# Patient Record
Sex: MALE | Race: WHITE | NOT HISPANIC OR LATINO | Employment: OTHER | URBAN - METROPOLITAN AREA
[De-identification: names, ages, dates, MRNs, and addresses within clinical notes are randomized per-mention and may not be internally consistent; named-entity substitution may affect disease eponyms.]

---

## 2023-10-20 DIAGNOSIS — R07.9 CHEST PAIN, UNSPECIFIED TYPE: ICD-10-CM

## 2023-10-20 PROBLEM — N40.0 BPH (BENIGN PROSTATIC HYPERPLASIA): Status: ACTIVE | Noted: 2023-10-20

## 2023-10-20 PROBLEM — J44.9 COPD (CHRONIC OBSTRUCTIVE PULMONARY DISEASE) (MULTI): Status: ACTIVE | Noted: 2023-10-20

## 2023-10-20 PROBLEM — R00.2 PALPITATIONS: Status: ACTIVE | Noted: 2023-10-20

## 2023-10-20 PROBLEM — I48.0 PAROXYSMAL ATRIAL FIBRILLATION (MULTI): Status: ACTIVE | Noted: 2023-10-20

## 2023-10-20 PROBLEM — R06.02 SHORTNESS OF BREATH: Status: ACTIVE | Noted: 2023-10-20

## 2023-10-20 PROBLEM — I25.10 CAD (CORONARY ARTERY DISEASE): Status: ACTIVE | Noted: 2023-10-20

## 2023-10-20 PROBLEM — E78.5 DYSLIPIDEMIA: Status: ACTIVE | Noted: 2023-10-20

## 2023-10-20 PROBLEM — I10 ESSENTIAL HYPERTENSION: Status: ACTIVE | Noted: 2023-10-20

## 2023-10-20 RX ORDER — CLOPIDOGREL BISULFATE 75 MG/1
75 TABLET ORAL DAILY
COMMUNITY

## 2023-10-20 RX ORDER — METOPROLOL SUCCINATE 25 MG/1
25 TABLET, EXTENDED RELEASE ORAL DAILY
COMMUNITY

## 2023-10-20 RX ORDER — NITROGLYCERIN 0.4 MG/1
0.4 TABLET SUBLINGUAL EVERY 5 MIN PRN
Qty: 90 TABLET | Refills: 3 | Status: SHIPPED | OUTPATIENT
Start: 2023-10-20

## 2023-10-20 RX ORDER — NITROGLYCERIN 0.4 MG/1
0.4 TABLET SUBLINGUAL EVERY 5 MIN PRN
COMMUNITY
Start: 2023-05-11 | End: 2023-10-20 | Stop reason: SDUPTHER

## 2023-10-20 RX ORDER — ROSUVASTATIN CALCIUM 20 MG/1
20 TABLET, COATED ORAL DAILY
COMMUNITY
Start: 2022-12-28

## 2023-10-20 RX ORDER — LISINOPRIL 20 MG/1
20 TABLET ORAL DAILY
COMMUNITY

## 2023-10-20 RX ORDER — FINASTERIDE 5 MG/1
5 TABLET, FILM COATED ORAL DAILY
COMMUNITY

## 2023-11-08 PROBLEM — S52.509A DISTAL RADIUS FRACTURE: Status: ACTIVE | Noted: 2023-11-08

## 2023-11-08 PROBLEM — R91.1 LUNG NODULE: Status: ACTIVE | Noted: 2023-11-08

## 2023-11-08 PROBLEM — E66.3 OVERWEIGHT: Status: ACTIVE | Noted: 2023-11-08

## 2023-11-08 RX ORDER — METOPROLOL SUCCINATE 50 MG/1
50 TABLET, EXTENDED RELEASE ORAL DAILY
COMMUNITY

## 2023-12-06 ENCOUNTER — HOSPITAL ENCOUNTER (OUTPATIENT)
Dept: RADIOLOGY | Facility: HOSPITAL | Age: 78
Discharge: HOME | End: 2023-12-06
Payer: MEDICARE

## 2023-12-06 DIAGNOSIS — R91.8 OTHER NONSPECIFIC ABNORMAL FINDING OF LUNG FIELD: ICD-10-CM

## 2023-12-06 PROCEDURE — 71250 CT THORAX DX C-: CPT | Mod: LIO

## 2023-12-06 PROCEDURE — 71250 CT THORAX DX C-: CPT | Mod: LIO | Performed by: RADIOLOGY

## 2023-12-13 ENCOUNTER — CLINICAL SUPPORT (OUTPATIENT)
Dept: CARDIAC REHAB | Facility: HOSPITAL | Age: 78
End: 2023-12-13
Payer: MEDICARE

## 2023-12-13 ENCOUNTER — TRANSCRIBE ORDERS (OUTPATIENT)
Dept: CARDIAC REHAB | Facility: HOSPITAL | Age: 78
End: 2023-12-13
Payer: COMMERCIAL

## 2023-12-13 DIAGNOSIS — J44.9 CHRONIC OBSTRUCTIVE PULMONARY DISEASE, UNSPECIFIED COPD TYPE (MULTI): ICD-10-CM

## 2023-12-13 DIAGNOSIS — J44.9 CHRONIC OBSTRUCTIVE PULMONARY DISEASE, UNSPECIFIED COPD TYPE (MULTI): Primary | ICD-10-CM

## 2023-12-13 NOTE — PROGRESS NOTES
Name: Femi Wilcox   : 1945   Diagnosis: COPD  MRN: [id number]   Onset Date: 2023    Today's Date: 23  Moderate    Respiratory  HX: COPD  Dyspnea: Yes  Describe:SOB with exertion, walking  HX YAZAN: No  CPAP Use: No  Family History of Lung Disease: Yes  Finger Clubbing: No  Hemoptysis: No  Tobacco Use: Quit tobacco use 2013 year ago  Other forms of tobacco: Cigarettes  Anyone in the house smoke: Yes  Vaccines: Flu  Number of colds per year: 2  Number of hospitalizations in past year: has never been hospitalized  Trouble sleeping: difficulty maintaining sleep  Limitations with ADLs: none  Others close to you affected by your health:yes  Activities you would like to do that your lung problem prevents you from doing: walking distances, riding bike  Exposure to asbestos/plastics/fumes/mining dust: {Yes    Cough: dry cough  Resting O2 sat:97%  Uses O2:no  Liters:  When do you use O2?:  none    Lung Sounds:  diminished  Locations: all lobes    Comments:    Neurological   Orientation: oriented to person, place, time, and general circumstances  HX: None  History of stroke/TIA?: none    Comments:    Cardiovascular   HX: CAD, HTN, and HLD  Angina: chest tightness, pressure-like  Describe:  History of Heart Failure: No  EF: N/A    Devices:  none  HX of PAD: No  Arrythmias: atrial fibrillation    Apical: regular  Heart Rate:65  BP:137/71  Radial pulses:  R Present 2+ L Present 2+    Comments:    Skin  Skin Color: normal, no cyanosis, jaundice, pallor or bruising, bruising  Edema:  none      Comments:    Gastrointestinal/Genitourinary  HX: none  Comments:      Psychosocial  Marital status:   Children: 1  Lives alone:  No  Lives with:  Drives:  Yes  Occupation: is retired   Caretaker of family member?:  No  Do you feel safe at home?:  Yes    Caffeinated drinks per day: 1 coffee daily  Alcoholic drinks per day/week:none  HX drug or alcohol abuse: No  Current use of illicit drugs:  No}    Comments:    Musculoskeletal  HX of injury/surgery:    Comments:    Pain Assessment  Current pain:  none  Location:  Description:    Comments:    Fall Risk Assessment  Assistive device: no device  Needs assistance:  No  Afraid of falling:  No  Fall within the past 6 months?: No  Injured with fall:  Fall risk results: low          INDIVIDUAL PULMONARY TREATMENT PLAN-INITIAL ASSESSMENT     Name: Femi Wilcox    Today's Date: 23   : 1945    Primary Provider: Meaghan Conrad CNP  MRN: [id number]    Referring Physician: Susie     Diagnosis: COPD    Onset Date: 2023       OXYGEN ASSESSMENT  SPO2 Rest: 97%  SPO2 Exertion:  Using supplemental O2: No  Liters rest: RA      Liters exertion: RA  Demonstrates appropriate knowledge of O2 use: No  Demonstrates appropriate knowledge of O2 safety: No  Home O2 system: N/A  Portable O2: N/A  Home pulse oximeter: No    Initial MMRC score: 2  Discharge MMRC score:     OXYGEN PLAN   Oxygen Goals:  1. Decrease MMRC score at discharge.  2. Decrease overall dyspnea during exercise using management techniques by discharge.     Oxygen Intervention/Education:   Titrate supplemental oxygen if needed to maintain SPO2 greater than 88%.  Monitor SPO2 pre, during and post exercise.      PSYCHOSOCIAL ASSESSMENT  Patient reported stress level:  none  Using stress management skills: No  HX of anxiety: No  HX of depression: No  Patient questioned regarding any new stress, depression and anxiety symptoms: Yes    Family/Support System: Wife & Daughter and family  Seeing mental health provider: No  Psychosocial medications:    Initial PHQ-9 score:   Discharge PHQ-9 score:   Was PHQ-9 faxed to provider: No  Date faxed:    Initial CAT score: 11  Discharge CAT score:     Stages of change: Action    PSYCHOSOCIAL PLAN  Psychosocial Goals:  Maintain or lower PHQ-9 survey score   Identify personal stressors and begin strategies for managing stress by discharge     Psychosocial  "Interventions/ Education:  Encourage to attend stress management educational class         OTHER CORE COMPONENTS/ RISK FACTORS ASSESSMENT  Medication compliance: good compliance  Inhaler use: Yes  Using pill box: No  Carries medication list: No  Comments:     Bronchial hygiene:  Cough: dry cough  Comments:     Blood Pressure Management:  History of High BP: Yes  Resting BP:131/71    Tobacco: FORMER  Quit Date: 7/27/2013  Anyone in the house smoke: Yes    Initial Knowledge Test Score: 12  Discharge Knowledge Test Score:    OTHER CORE COMPONENTS/ RISK FACTOR PLAN   Other Core components/Risk Factor Goals:  1. Maintain resting BP <130/80 during program, as optimal goal.   2. Gain knowledge of pulmonary disease and chronic disease management prior to discharge.    Other Components/ Risk Factors Intervention/Education:  *Will continue to monitor HR, BP, and SPO2 each session.  *Encourage review of educational materials.    NUTRITION ASSESSMENT  Diabetes: No  HbA1c:  Date Checked:  Monitors glucose at home: N/A  Fasting Blood Sugar Range:  Frequency:  Hypoglycemic Episode:    Weight Management  Weight: 205.2  Height: 5'9\"  BMI:  30.3  Current Diet: regular  Barriers to dietary change:    Initial Dietary Assessment Score: To be scored  Discharge Dietary Assessment Score:     NUTRITION PLAN  Nutrition Goals:   Improve Picture Your Plate assessment results by discharge.  Increase diet survey by 5-10% by discharge     Nutrition Intervention/Education:   Encouraged to attend dietary lectures   Pt to return diet survey      EXERCISE ASSESSMENT  Home Exercise?: No  Frequency:   Mode:    Initial 6 Minute Walk Test Assessment: To be done at first session  Distance (meters):  FiO2:  SPO2:    Date of first exercise session:    EXERCISE PLAN  Exercise Goals:   To increase MET level by 5-10% each week   Increase exercise duration to 30-45 minutes each session   Increase exercise frequency to 3-5 days per week     Exercise " Prescription:   Based on 6 Minute Walk Test  Frequency: 2 days per week  Duration: 30-40 minutes  Intensity RPE: 11-14  Target HR:  MET Level Range:    Prescribed oxygen flow rate (l/m): RA  Prescribed SPO2 parameters: Greater than 88%       Modality METS Load  Duration   1 Rest    05:00   2 Treadmill Walk 2.2 1.5 mph  10:00   3 NuStep 2.1 32 Henry 2 10:00   4 Cardiostrider 2.6 40 spm 2 10:00   5 Arm Weights  3 lb  5:00   7 Leg Weights  3lb  5:00   8 Rest    5:00     Exercise Intervention:   To increase MET level by 5-10% each week   Incorporate resistance training for muscular endurance and strength.  To increase total exercise duration to 30-45 minutes       LEARNING ASSESSMENT & BARRIERS  Readiness to Learn: yes  Barriers: None  Comments:    FALL RISK  low  Comments:        INDIVIDUAL PATIENT GOALS  Breathing better  2.   Increase stamina        MEDICATIONS  Current Outpatient Medications   Medication Instructions    clopidogrel (PLAVIX) 75 mg, oral, Daily    finasteride (PROSCAR) 5 mg, oral, Daily    lisinopril 20 mg, oral, Daily    metoprolol succinate XL (TOPROL-XL) 50 mg, oral, Daily    metoprolol succinate XL (TOPROL-XL) 50 mg, oral, Daily, In addition to 25mg script for total of 75mg once daily     nitroglycerin (NITROSTAT) 0.4 mg, sublingual, Every 5 min PRN    rosuvastatin (CRESTOR) 20 mg, oral, Daily   Flomax 0,4 mg oral daily at bedtime.

## 2023-12-18 ENCOUNTER — TRANSCRIBE ORDERS (OUTPATIENT)
Dept: CARDIAC REHAB | Facility: HOSPITAL | Age: 78
End: 2023-12-18
Payer: COMMERCIAL

## 2023-12-18 DIAGNOSIS — J44.9 CHRONIC OBSTRUCTIVE PULMONARY DISEASE, UNSPECIFIED COPD TYPE (MULTI): Primary | ICD-10-CM

## 2023-12-20 ENCOUNTER — CLINICAL SUPPORT (OUTPATIENT)
Dept: CARDIAC REHAB | Facility: HOSPITAL | Age: 78
End: 2023-12-20
Payer: MEDICARE

## 2023-12-20 DIAGNOSIS — J44.9 CHRONIC OBSTRUCTIVE PULMONARY DISEASE, UNSPECIFIED COPD TYPE (MULTI): ICD-10-CM

## 2023-12-20 PROCEDURE — 94625 PHY/QHP OP PULM RHB W/O MNTR: CPT | Performed by: SPECIALIST

## 2023-12-22 ENCOUNTER — CLINICAL SUPPORT (OUTPATIENT)
Dept: CARDIAC REHAB | Facility: HOSPITAL | Age: 78
End: 2023-12-22
Payer: MEDICARE

## 2023-12-22 DIAGNOSIS — J44.9 CHRONIC OBSTRUCTIVE PULMONARY DISEASE, UNSPECIFIED COPD TYPE (MULTI): ICD-10-CM

## 2023-12-22 PROCEDURE — 94625 PHY/QHP OP PULM RHB W/O MNTR: CPT | Performed by: SPECIALIST

## 2023-12-27 ENCOUNTER — CLINICAL SUPPORT (OUTPATIENT)
Dept: CARDIAC REHAB | Facility: HOSPITAL | Age: 78
End: 2023-12-27
Payer: MEDICARE

## 2023-12-27 DIAGNOSIS — J44.9 CHRONIC OBSTRUCTIVE PULMONARY DISEASE, UNSPECIFIED COPD TYPE (MULTI): ICD-10-CM

## 2023-12-27 PROCEDURE — 94625 PHY/QHP OP PULM RHB W/O MNTR: CPT | Performed by: SPECIALIST

## 2023-12-29 ENCOUNTER — APPOINTMENT (OUTPATIENT)
Dept: CARDIAC REHAB | Facility: HOSPITAL | Age: 78
End: 2023-12-29
Payer: MEDICARE

## 2024-01-03 ENCOUNTER — APPOINTMENT (OUTPATIENT)
Dept: CARDIAC REHAB | Facility: HOSPITAL | Age: 79
End: 2024-01-03
Payer: COMMERCIAL

## 2024-01-05 ENCOUNTER — CLINICAL SUPPORT (OUTPATIENT)
Dept: CARDIAC REHAB | Facility: HOSPITAL | Age: 79
End: 2024-01-05
Payer: MEDICARE

## 2024-01-05 DIAGNOSIS — J44.9 CHRONIC OBSTRUCTIVE PULMONARY DISEASE, UNSPECIFIED COPD TYPE (MULTI): ICD-10-CM

## 2024-01-05 PROCEDURE — 94625 PHY/QHP OP PULM RHB W/O MNTR: CPT | Performed by: SPECIALIST

## 2024-01-08 NOTE — PROGRESS NOTES
INDIVIDUAL PULMONARY TREATMENT PLAN- 30 Day ASSESSMENT     Name: Femi Wilcox    Today's Date: 24   : 1945    Primary Provider: Meaghan Conrad CNP  MRN: [id number]    Referring Physician: Susie     Diagnosis: COPD    Onset Date: 2023       OXYGEN ASSESSMENT  SPO2 Rest: 97%  SPO2 Exertion: 95%  Using supplemental O2: No  Liters rest: RA      Liters exertion: RA  Demonstrates appropriate knowledge of O2 use: No  Demonstrates appropriate knowledge of O2 safety: No  Home O2 system: N/A  Portable O2: N/A  Home pulse oximeter: No    Initial MMRC score: 2  Discharge MMRC score:     OXYGEN PLAN   Oxygen Goals:  1. Decrease MMRC score at discharge.  2. Decrease overall dyspnea during exercise using management techniques by discharge.     Oxygen Intervention/Education:   Titrate supplemental oxygen if needed to maintain SPO2 greater than 88%.  Monitor SPO2 pre, during and post exercise.  Discussed Spo2 with exercise and weather precautions.    PSYCHOSOCIAL ASSESSMENT  Patient reported stress level:  none  Using stress management skills: No  HX of anxiety: No  HX of depression: No  Patient questioned regarding any new stress, depression and anxiety symptoms: Yes    Family/Support System: Wife & Daughter and family  Seeing mental health provider: No  Psychosocial medications: none    Initial PHQ-9 score: 0  Discharge PHQ-9 score:   Was PHQ-9 faxed to provider: No  Date faxed:    Initial CAT score: 11  Discharge CAT score:     Stages of change: Action    PSYCHOSOCIAL PLAN  Psychosocial Goals:  Maintain or lower PHQ-9 survey score   Identify personal stressors and begin strategies for managing stress by discharge     Psychosocial Interventions/ Education:  Encourage to attend stress management educational class   No change in psychosocial status reported by the patient.      OTHER CORE COMPONENTS/ RISK FACTORS ASSESSMENT  Medication compliance: good compliance  Inhaler use: Yes  Using pill box: No  Carries  "medication list: No  Comments:     Bronchial hygiene:  Cough: dry cough  Comments:     Blood Pressure Management:  History of High BP: Yes  Resting BP:117/66    Tobacco: FORMER  Quit Date: 7/27/2013  Anyone in the house smoke: Yes    Initial Knowledge Test Score: 12  Discharge Knowledge Test Score:    OTHER CORE COMPONENTS/ RISK FACTOR PLAN   Other Core components/Risk Factor Goals:  1. Maintain resting BP <130/80 during program, as optimal goal.   2. Gain knowledge of pulmonary disease and chronic disease management prior to discharge.    Other Components/ Risk Factors Intervention/Education:  *Will continue to monitor HR, BP, and SPO2 each session.  *BP remains with in target range.    NUTRITION ASSESSMENT  Diabetes: No  HbA1c:  Date Checked:  Monitors glucose at home: N/A  Fasting Blood Sugar Range:  Frequency:  Hypoglycemic Episode:    Weight Management  Weight: 200.3  Height: 5'9\"  BMI:  29.6  Current Diet: regular  Barriers to dietary change: none    Initial Dietary Assessment Score: 54%  Discharge Dietary Assessment Score:     NUTRITION PLAN  Nutrition Goals:   Improve Picture Your Plate assessment results by discharge.  Increase diet survey by 5-10% by discharge     Nutrition Intervention/Education:   Patient scored a 54% on his diet survey. Will encourage him to meet with the dietician individually.  Patient attended diet class with the dietician and discussed healthy options and tips.    EXERCISE ASSESSMENT  Home Exercise?: No  Frequency:   Mode:    Initial 6 Minute Walk Test Assessment: To be done at first session  Distance (meters): 411.2 meters  FiO2: RA  SPO2: 92%    Date of first exercise session: 12/20/2023    EXERCISE PLAN  Exercise Goals:   To increase MET level by 5-10% each week   Increase exercise duration to 30-45 minutes each session   Increase exercise frequency to 3-5 days per week     Exercise Prescription:   Based on 6 Minute Walk Test  Frequency: 2 days per week  Duration: 30-40 " minutes  Intensity RPE: 11-14  Target HR: 81-91  MET Level Range: 2.1-2.6    Prescribed oxygen flow rate (l/m): RA  Prescribed SPO2 parameters: Greater than 88%       Modality METS Load  Duration   1 Rest    05:00   2 Treadmill Walk 2.2 1.5 mph  10:00   3 NuStep 2.1 32 Henry 2 10:00   4 Cardiostrider 2.6 40 spm 3 20:00   5 Arm Weights  3 lb  5:00   7 Leg Weights  3lb  5:00   8 Rest    5:00     Exercise Intervention:   To increase MET level by 5-10% each week or as tolerated.  Incorporate resistance training for muscular endurance and strength.  To increase total exercise duration to 30-45 minutes   Continued education on equipment use.    LEARNING ASSESSMENT & BARRIERS  Readiness to Learn: yes  Barriers: None  Comments:    FALL RISK  low  Comments:        INDIVIDUAL PATIENT GOALS  Breathing better  2.   Increase stamina        MEDICATIONS  Current Outpatient Medications   Medication Instructions    clopidogrel (PLAVIX) 75 mg, oral, Daily    finasteride (PROSCAR) 5 mg, oral, Daily    lisinopril 20 mg, oral, Daily    metoprolol succinate XL (TOPROL-XL) 50 mg, oral, Daily    metoprolol succinate XL (TOPROL-XL) 50 mg, oral, Daily, In addition to 25mg script for total of 75mg once daily     nitroglycerin (NITROSTAT) 0.4 mg, sublingual, Every 5 min PRN    rosuvastatin (CRESTOR) 20 mg, oral, Daily   Flomax 0,4 mg oral daily at bedtime.        Patient reports no angina with exercise. He is currently exercising 30-35 minutes on the cardiostridor. The patient is increasing his resistance as tolerated. He is rating his dyspnea as mild and his SPO2 has been 92% or greater on room air with exercise. His Blood pressure has been WNL. He participated in group resistance training.      Education:  Aerobic Exercise/weather precautions/Spo2 with exercise/Weight training  Group diet class with dietician      I

## 2024-01-10 ENCOUNTER — CLINICAL SUPPORT (OUTPATIENT)
Dept: CARDIAC REHAB | Facility: HOSPITAL | Age: 79
End: 2024-01-10
Payer: MEDICARE

## 2024-01-10 ENCOUNTER — DOCUMENTATION (OUTPATIENT)
Dept: PULMONOLOGY | Facility: HOSPITAL | Age: 79
End: 2024-01-10

## 2024-01-10 DIAGNOSIS — J44.9 CHRONIC OBSTRUCTIVE PULMONARY DISEASE, UNSPECIFIED COPD TYPE (MULTI): ICD-10-CM

## 2024-01-10 PROCEDURE — 94625 PHY/QHP OP PULM RHB W/O MNTR: CPT | Performed by: SPECIALIST

## 2024-01-11 NOTE — PROGRESS NOTES
"Expressive Therapy Visit:     Subjective   Patient ID: Femi Wilcox is a 78 y.o. male who presents for No chief complaint on file.      Therapy Session  Referral Type: New referral this admission  Visit Type: New visit  Session Start Time: 1130  Session End Time: 1200  Intervention Delivery: In-person  Conflict of Service: None  Family Present for Session: None  Number of staff members present: 1    Pre-assessment  Unable to Assess Reason: Outcomes not applicable  Mood/Affect: Appropriate, Calm  Verbalized Emotional State: Acceptance        Objective       Treatment  Discipline: Music Therapy  Areas of Focus: Physiological functioning improvement  Music Therapy Interventions: Empathic listening/validating emotions  Co-Treatment: Other (comment) (RT)    Post-assessment  Unable to Assess Reason: Outcomes not applicable  Mood/Affect: Calm, Cooperative, Appropriate  Verbalized Emotional State: Acceptance  Total Session Time (min): 30 minutes    Assessment/Plan       Narrative  Assessment Detail: PT sitting in education room with other participants. This PT was pleasant and shared they were doing \"OK\" today. PT worked with RT discussing How the Lungs Work prior to MT discussion.  Plan: To implement education on the use of the harmonica for better health and lung support.  Intervention: MT educated PT on the use of the harmonica for better lung function with a focus on diaphragmatic breathing.  Evaluation: PT with eye contact and head nodding confirming their understanding of information given.  Follow-up: MT to follow up in next pulmonary class.    Education Documentation  No documentation found.  Education Comments  No comments found.            "

## 2024-01-11 NOTE — PROGRESS NOTES
Expressive Therapy Visit:     Subjective   Patient ID: Femi Wilcox is a 78 y.o. male who presents for No chief complaint on file.                    Objective                 Assessment/Plan            Education Documentation  No documentation found.  Education Comments  No comments found.          Expressive Therapies Note

## 2024-01-12 ENCOUNTER — CLINICAL SUPPORT (OUTPATIENT)
Dept: CARDIAC REHAB | Facility: HOSPITAL | Age: 79
End: 2024-01-12
Payer: MEDICARE

## 2024-01-12 DIAGNOSIS — J44.9 CHRONIC OBSTRUCTIVE PULMONARY DISEASE, UNSPECIFIED COPD TYPE (MULTI): ICD-10-CM

## 2024-01-12 PROCEDURE — 94625 PHY/QHP OP PULM RHB W/O MNTR: CPT | Performed by: SPECIALIST

## 2024-01-17 ENCOUNTER — CLINICAL SUPPORT (OUTPATIENT)
Dept: CARDIAC REHAB | Facility: HOSPITAL | Age: 79
End: 2024-01-17
Payer: MEDICARE

## 2024-01-17 DIAGNOSIS — J44.9 CHRONIC OBSTRUCTIVE PULMONARY DISEASE, UNSPECIFIED COPD TYPE (MULTI): ICD-10-CM

## 2024-01-17 PROCEDURE — 94625 PHY/QHP OP PULM RHB W/O MNTR: CPT | Performed by: SPECIALIST

## 2024-01-19 ENCOUNTER — APPOINTMENT (OUTPATIENT)
Dept: CARDIAC REHAB | Facility: HOSPITAL | Age: 79
End: 2024-01-19
Payer: MEDICARE

## 2024-01-24 ENCOUNTER — APPOINTMENT (OUTPATIENT)
Dept: CARDIAC REHAB | Facility: HOSPITAL | Age: 79
End: 2024-01-24
Payer: COMMERCIAL

## 2024-01-26 ENCOUNTER — CLINICAL SUPPORT (OUTPATIENT)
Dept: CARDIAC REHAB | Facility: HOSPITAL | Age: 79
End: 2024-01-26
Payer: MEDICARE

## 2024-01-26 DIAGNOSIS — J44.9 CHRONIC OBSTRUCTIVE PULMONARY DISEASE, UNSPECIFIED COPD TYPE (MULTI): ICD-10-CM

## 2024-01-26 PROCEDURE — 94625 PHY/QHP OP PULM RHB W/O MNTR: CPT | Performed by: SPECIALIST

## 2024-01-31 ENCOUNTER — CLINICAL SUPPORT (OUTPATIENT)
Dept: CARDIAC REHAB | Facility: HOSPITAL | Age: 79
End: 2024-01-31
Payer: MEDICARE

## 2024-01-31 DIAGNOSIS — J44.9 CHRONIC OBSTRUCTIVE PULMONARY DISEASE, UNSPECIFIED COPD TYPE (MULTI): ICD-10-CM

## 2024-01-31 PROCEDURE — 94625 PHY/QHP OP PULM RHB W/O MNTR: CPT | Performed by: SPECIALIST

## 2024-02-01 NOTE — PROGRESS NOTES
INDIVIDUAL PULMONARY TREATMENT PLAN- 30 Day ASSESSMENT     Name: Femi Wilcox    Today's Date: 24   : 1945    Primary Provider: Meaghan Conrad CNP  MRN: [id number]    Referring Physician: Susie     Diagnosis: COPD    Onset Date: 2023       OXYGEN ASSESSMENT  SPO2 Rest: 97%  SPO2 Exertion: 95%  Using supplemental O2: No  Liters rest: RA      Liters exertion: RA  Demonstrates appropriate knowledge of O2 use: No  Demonstrates appropriate knowledge of O2 safety: No  Home O2 system: N/A  Portable O2: N/A  Home pulse oximeter: No    Initial MMRC score: 2  Discharge MMRC score:     OXYGEN PLAN   Oxygen Goals:  1. Decrease MMRC score at discharge.  2. Decrease overall dyspnea during exercise using management techniques by discharge.     Oxygen Intervention/Education:   Titrate supplemental oxygen if needed to maintain SPO2 greater than 88%.  Will continue to monitor SPO2 and heart rate while exercising  Patient attended class on how the lungs work  Patient attended class on allergens and exacerbations, discussed how to avoid causing exacerbations and common allergens that can cause issues.       PSYCHOSOCIAL ASSESSMENT  Patient reported stress level:  none  Using stress management skills: No  HX of anxiety: No  HX of depression: No  Patient questioned regarding any new stress, depression and anxiety symptoms: Yes    Family/Support System: Wife & Daughter and family  Seeing mental health provider: No  Psychosocial medications: none    Initial PHQ-9 score: 0  Discharge PHQ-9 score:   Was PHQ-9 faxed to provider: No  Date faxed:    Initial CAT score: 11  Discharge CAT score:     Stages of change: Action    PSYCHOSOCIAL PLAN  Psychosocial Goals:  Maintain or lower PHQ-9 survey score   Identify personal stressors and begin strategies for managing stress by discharge     Psychosocial Interventions/ Education:  Encourage to attend stress management educational class   Patient reports no new stressors at this  "time  Patient is able to see the program counselor at any time upon request while enrolled in the program.       OTHER CORE COMPONENTS/ RISK FACTORS ASSESSMENT  Medication compliance: good compliance  Inhaler use: Yes  Using pill box: No  Carries medication list: No  Comments:     Bronchial hygiene:  Cough: dry cough  Comments:     Blood Pressure Management:  History of High BP: Yes  Resting BP: 122/68    Tobacco: FORMER  Quit Date: 7/27/2013  Anyone in the house smoke: Yes    Initial Knowledge Test Score: 12  Discharge Knowledge Test Score:    OTHER CORE COMPONENTS/ RISK FACTOR PLAN   Other Core components/Risk Factor Goals:  1. Maintain resting BP <130/80 during program, as optimal goal.   2. Gain knowledge of pulmonary disease and chronic disease management prior to discharge.    Other Components/ Risk Factors Intervention/Education:  Patient's blood pressure has been within normal limits  Continuing to monitor HR, BP, and SPO2 each session    NUTRITION ASSESSMENT  Diabetes: No  HbA1c:  Date Checked:  Monitors glucose at home: N/A  Fasting Blood Sugar Range:  Frequency:  Hypoglycemic Episode:    Weight Management  Weight: 201.7  Height: 5'9\"  BMI:  29.6  Current Diet: regular  Barriers to dietary change: none    Initial Dietary Assessment Score: 54%  Discharge Dietary Assessment Score:     NUTRITION PLAN  Nutrition Goals:   Improve Picture Your Plate assessment results by discharge.  Increase diet survey by 5-10% by discharge     Nutrition Intervention/Education:   Patient has decreased weight while in the program  Will encourage patient to have a one-on-one with dietitian     EXERCISE ASSESSMENT  Home Exercise?: No  Frequency:   Mode:    Initial 6 Minute Walk Test Assessment: To be done at first session  Distance (meters): 411.2 meters  FiO2: RA  SPO2: 92%    Date of first exercise session: 12/20/2023    EXERCISE PLAN  Exercise Goals:   To increase MET level by 5-10% each week   Increase exercise duration to " 30-45 minutes each session   Increase exercise frequency to 3-5 days per week     Exercise Prescription:   Based on 6 Minute Walk Test  Frequency: 2 days per week  Duration: 30-40 minutes  Intensity RPE: 11-14  Target HR: 80-90  MET Level Range: 2.2-3.6    Prescribed oxygen flow rate (l/m): RA  Prescribed SPO2 parameters: Greater than 88%       Modality METS Load  Duration   1 Rest    05:00   2 Treadmill Walk 2.2 1.5 mph  10:00   3 NuStep 2.7 62 [address] 4 10:00   4 Cardiostrider 3.6 40 spm 4 20:00   5 Arm Weights  3 lb  5:00   7 Leg Weights  3lb  5:00   8 Rest    5:00     Exercise Intervention:   Patient is increasing weekly on equipment.  Continue to educate patient on equipment use  Patient is participating in group resistance training    LEARNING ASSESSMENT & BARRIERS  Readiness to Learn: yes  Barriers: None  Comments:    FALL RISK  low  Comments:        INDIVIDUAL PATIENT GOALS  Breathing better  2.   Increase stamina        MEDICATIONS  Current Outpatient Medications   Medication Instructions    clopidogrel (PLAVIX) 75 mg, oral, Daily    finasteride (PROSCAR) 5 mg, oral, Daily    lisinopril 20 mg, oral, Daily    metoprolol succinate XL (TOPROL-XL) 50 mg, oral, Daily    metoprolol succinate XL (TOPROL-XL) 50 mg, oral, Daily, In addition to 25mg script for total of 75mg once daily     nitroglycerin (NITROSTAT) 0.4 mg, sublingual, Every 5 min PRN    rosuvastatin (CRESTOR) 20 mg, oral, Daily   Flomax 0,4 mg oral daily at bedtime.        STAFF COMMENTS: Patient reports no angina with exercise. He is currently exercising 30-40 minutes on either the Cardiostrider, NuStep, treadmill, and weights depending on availability. The patient is increasing his resistance as tolerated. He is rating his dyspnea as mild and his SPO2 has been 92% or greater on room air with exercise. His blood pressure has been WNL. He regularly participates in group resistance training. Will continue with exercise and education.        Education:  Aerobic Exercise/weather precautions/Spo2 with exercise/Weight training  Group diet class with dietician  How the lungs work  Allergens and exacerbations

## 2024-02-02 ENCOUNTER — CLINICAL SUPPORT (OUTPATIENT)
Dept: CARDIAC REHAB | Facility: HOSPITAL | Age: 79
End: 2024-02-02
Payer: MEDICARE

## 2024-02-02 DIAGNOSIS — J44.9 CHRONIC OBSTRUCTIVE PULMONARY DISEASE, UNSPECIFIED COPD TYPE (MULTI): ICD-10-CM

## 2024-02-02 PROCEDURE — 94625 PHY/QHP OP PULM RHB W/O MNTR: CPT | Performed by: SPECIALIST

## 2024-02-07 ENCOUNTER — CLINICAL SUPPORT (OUTPATIENT)
Dept: CARDIAC REHAB | Facility: HOSPITAL | Age: 79
End: 2024-02-07
Payer: MEDICARE

## 2024-02-07 DIAGNOSIS — J44.9 CHRONIC OBSTRUCTIVE PULMONARY DISEASE, UNSPECIFIED COPD TYPE (MULTI): ICD-10-CM

## 2024-02-07 PROCEDURE — 94625 PHY/QHP OP PULM RHB W/O MNTR: CPT | Performed by: SPECIALIST

## 2024-02-09 ENCOUNTER — CLINICAL SUPPORT (OUTPATIENT)
Dept: CARDIAC REHAB | Facility: HOSPITAL | Age: 79
End: 2024-02-09
Payer: MEDICARE

## 2024-02-09 DIAGNOSIS — J44.9 CHRONIC OBSTRUCTIVE PULMONARY DISEASE, UNSPECIFIED COPD TYPE (MULTI): ICD-10-CM

## 2024-02-09 PROCEDURE — 94625 PHY/QHP OP PULM RHB W/O MNTR: CPT | Performed by: SPECIALIST

## 2024-02-14 ENCOUNTER — APPOINTMENT (OUTPATIENT)
Dept: CARDIAC REHAB | Facility: HOSPITAL | Age: 79
End: 2024-02-14
Payer: COMMERCIAL

## 2024-02-16 ENCOUNTER — APPOINTMENT (OUTPATIENT)
Dept: CARDIAC REHAB | Facility: HOSPITAL | Age: 79
End: 2024-02-16
Payer: COMMERCIAL

## 2024-02-21 ENCOUNTER — APPOINTMENT (OUTPATIENT)
Dept: CARDIAC REHAB | Facility: HOSPITAL | Age: 79
End: 2024-02-21
Payer: COMMERCIAL

## 2024-02-21 NOTE — PROGRESS NOTES
INDIVIDUAL PULMONARY TREATMENT PLAN- 60 day ASSESSMENT     Name: Femi Wilcox    Today's Date: 24   : 1945    Primary Provider: Meaghan Conrad CNP  MRN: [id number]    Referring Physician: Susie     Diagnosis: COPD    Onset Date: 2023       OXYGEN ASSESSMENT  SPO2 Rest: 97%  SPO2 Exertion: 95%  Using supplemental O2: No  Liters rest: RA      Liters exertion: RA  Demonstrates appropriate knowledge of O2 use: No  Demonstrates appropriate knowledge of O2 safety: No  Home O2 system: N/A  Portable O2: N/A  Home pulse oximeter: No    Initial MMRC score: 2  Discharge MMRC score:     OXYGEN PLAN   Oxygen Goals:  1. Decrease MMRC score at discharge.  2. Decrease overall dyspnea during exercise using management techniques by discharge.     Oxygen Intervention/Education:   Titrate supplemental oxygen if needed to maintain SPO2 greater than 88%.  Will continue to monitor SPO2 and heart rate while exercising  Patient attended class on how the lungs work  Patient attended class on allergens and exacerbations, discussed how to avoid causing exacerbations and common allergens that can cause issues.       PSYCHOSOCIAL ASSESSMENT  Patient reported stress level:  none  Using stress management skills: No  HX of anxiety: No  HX of depression: No  Patient questioned regarding any new stress, depression and anxiety symptoms: Yes    Family/Support System: Wife & Daughter and family  Seeing mental health provider: No  Psychosocial medications: none    Initial PHQ-9 score: 0  Discharge PHQ-9 score:   Was PHQ-9 faxed to provider: No  Date faxed:    Initial CAT score: 11  Discharge CAT score:     Stages of change: Action    PSYCHOSOCIAL PLAN  Psychosocial Goals:  Maintain or lower PHQ-9 survey score   Identify personal stressors and begin strategies for managing stress by discharge     Psychosocial Interventions/ Education:  Patient attended Stress class  and discussed S/S of depression,anxiety, breathless cycle.relaxation  "techniques, and community resources.  Patient reports no new stressors at this time  Patient is able to see the program counselor at any time upon request while enrolled in the program.       OTHER CORE COMPONENTS/ RISK FACTORS ASSESSMENT  Medication compliance: good compliance  Inhaler use: Yes  Using pill box: No  Carries medication list: No  Comments:     Bronchial hygiene:  Cough: dry cough  Comments:     Blood Pressure Management:  History of High BP: Yes  Resting BP: 120/69    Tobacco: FORMER  Quit Date: 7/27/2013  Anyone in the house smoke: Yes    Initial Knowledge Test Score: 12  Discharge Knowledge Test Score:    OTHER CORE COMPONENTS/ RISK FACTOR PLAN   Other Core components/Risk Factor Goals:  1. Maintain resting BP <130/80 during program, as optimal goal.   2. Gain knowledge of pulmonary disease and chronic disease management prior to discharge.    Other Components/ Risk Factors Intervention/Education:  Patient's blood pressure has been within normal limits  Continuing to monitor HR, BP, and SPO2 each session  Patient attended medication class with the pharmacist. Discussed medications and inhaler usage.    NUTRITION ASSESSMENT  Diabetes: No  HbA1c:  Date Checked:  Monitors glucose at home: N/A  Fasting Blood Sugar Range:  Frequency:  Hypoglycemic Episode:    Weight Management  Weight: 202.7  Height: 5'9\"  BMI:  29.6  Current Diet: regular  Barriers to dietary change: none    Initial Dietary Assessment Score: 54%  Discharge Dietary Assessment Score:     NUTRITION PLAN  Nutrition Goals:   Improve Picture Your Plate assessment results by discharge.  Increase diet survey by 5-10% by discharge     Nutrition Intervention/Education:   Patient has decreased weight while in the program  Will encourage patient to have a one-on-one with dietitian     EXERCISE ASSESSMENT  Home Exercise?: No  Frequency:   Mode:    Initial 6 Minute Walk Test Assessment: To be done at first session  Distance (meters): 411.2 " meters  FiO2: RA  SPO2: 92%    Date of first exercise session: 12/20/2023    EXERCISE PLAN  Exercise Goals:   To increase MET level by 5-10% each week   Increase exercise duration to 30-45 minutes each session   Increase exercise frequency to 3-5 days per week     Exercise Prescription:   Based on 6 Minute Walk Test  Frequency: 2 days per week  Duration: 30-40 minutes  Intensity RPE: 11-14  Target HR: 82-92  MET Level Range: 2.2-3.6    Prescribed oxygen flow rate (l/m): RA  Prescribed SPO2 parameters: Greater than 88%       Modality METS Load  Duration   1 Rest    05:00   2 Treadmill Walk 2.2 1.5 mph  10:00   3 NuStep 3.3 92 Henry 7 20:00   4 Cardiostrider 3.6 40 spm 4 10:00   5 Arm Weights  3 lb  5:00   7 Leg Weights  3lb  5:00   8 Rest    5:00     Exercise Intervention:   Patient is increasing weekly on equipment.  Continue to educate patient on equipment use  Patient is participating in group resistance training    LEARNING ASSESSMENT & BARRIERS  Readiness to Learn: yes  Barriers: None  Comments:    FALL RISK  low  Comments:        INDIVIDUAL PATIENT GOALS  Breathing better  2.   Increase stamina        MEDICATIONS  Current Outpatient Medications   Medication Instructions    clopidogrel (PLAVIX) 75 mg, oral, Daily    finasteride (PROSCAR) 5 mg, oral, Daily    lisinopril 20 mg, oral, Daily    metoprolol succinate XL (TOPROL-XL) 50 mg, oral, Daily    metoprolol succinate XL (TOPROL-XL) 50 mg, oral, Daily, In addition to 25mg script for total of 75mg once daily     nitroglycerin (NITROSTAT) 0.4 mg, sublingual, Every 5 min PRN    rosuvastatin (CRESTOR) 20 mg, oral, Daily   Flomax 0,4 mg oral daily at bedtime.        STAFF COMMENTS: Patient reports no angina with exercise. He is currently exercising 30-40 minutes on either the Cardiostrider, NuStep, and treadmill. He is rating his dyspnea as mild and his SPO2 has been 92% or greater on room air with exercise. His blood pressure has been WNL. He regularly participates  in group resistance training. Patient called and stated he wanted to be discharged from the program. Home Program and missed education mailed to the patient.  Education:  Aerobic Exercise/weather precautions/Spo2 with exercise/Weight training  Group diet class with dietician  How the lungs work  Allergens and exacerbations  Medications  Stress

## 2024-02-23 ENCOUNTER — APPOINTMENT (OUTPATIENT)
Dept: CARDIAC REHAB | Facility: HOSPITAL | Age: 79
End: 2024-02-23
Payer: COMMERCIAL

## 2024-03-13 ENCOUNTER — APPOINTMENT (OUTPATIENT)
Dept: CARDIAC REHAB | Facility: HOSPITAL | Age: 79
End: 2024-03-13
Payer: MEDICARE

## 2024-03-15 ENCOUNTER — APPOINTMENT (OUTPATIENT)
Dept: CARDIAC REHAB | Facility: HOSPITAL | Age: 79
End: 2024-03-15
Payer: MEDICARE

## 2024-03-19 ENCOUNTER — TELEPHONE (OUTPATIENT)
Dept: CARDIOLOGY | Facility: CLINIC | Age: 79
End: 2024-03-19
Payer: COMMERCIAL

## 2024-03-19 ENCOUNTER — APPOINTMENT (OUTPATIENT)
Dept: RADIOLOGY | Facility: HOSPITAL | Age: 79
DRG: 176 | End: 2024-03-19
Payer: MEDICARE

## 2024-03-19 ENCOUNTER — HOSPITAL ENCOUNTER (INPATIENT)
Facility: HOSPITAL | Age: 79
LOS: 2 days | Discharge: HOME | DRG: 176 | End: 2024-03-21
Attending: EMERGENCY MEDICINE | Admitting: HOSPITALIST
Payer: MEDICARE

## 2024-03-19 ENCOUNTER — APPOINTMENT (OUTPATIENT)
Dept: CARDIOLOGY | Facility: HOSPITAL | Age: 79
DRG: 176 | End: 2024-03-19
Payer: MEDICARE

## 2024-03-19 DIAGNOSIS — R06.01 ORTHOPNEA: ICD-10-CM

## 2024-03-19 DIAGNOSIS — M79.89 SWELLING OF BOTH LOWER EXTREMITIES: ICD-10-CM

## 2024-03-19 DIAGNOSIS — I26.99 OTHER ACUTE PULMONARY EMBOLISM WITHOUT ACUTE COR PULMONALE (MULTI): ICD-10-CM

## 2024-03-19 DIAGNOSIS — I26.99 PULMONARY EMBOLISM, UNSPECIFIED CHRONICITY, UNSPECIFIED PULMONARY EMBOLISM TYPE, UNSPECIFIED WHETHER ACUTE COR PULMONALE PRESENT (MULTI): Primary | ICD-10-CM

## 2024-03-19 LAB
ALBUMIN SERPL BCP-MCNC: 3.5 G/DL (ref 3.4–5)
ALP SERPL-CCNC: 78 U/L (ref 33–136)
ALT SERPL W P-5'-P-CCNC: 13 U/L (ref 10–52)
ANION GAP SERPL CALC-SCNC: 8 MMOL/L (ref 10–20)
APTT PPP: 28 SECONDS (ref 27–38)
AST SERPL W P-5'-P-CCNC: 20 U/L (ref 9–39)
ATRIAL RATE: 65 BPM
BASOPHILS # BLD AUTO: 0.02 X10*3/UL (ref 0–0.1)
BASOPHILS NFR BLD AUTO: 0.4 %
BILIRUB SERPL-MCNC: 0.6 MG/DL (ref 0–1.2)
BNP SERPL-MCNC: 171 PG/ML (ref 0–99)
BUN SERPL-MCNC: 11 MG/DL (ref 6–23)
CALCIUM SERPL-MCNC: 8.4 MG/DL (ref 8.6–10.3)
CARDIAC TROPONIN I PNL SERPL HS: 10 NG/L (ref 0–20)
CARDIAC TROPONIN I PNL SERPL HS: 9 NG/L (ref 0–20)
CHLORIDE SERPL-SCNC: 103 MMOL/L (ref 98–107)
CO2 SERPL-SCNC: 28 MMOL/L (ref 21–32)
CREAT SERPL-MCNC: 1.05 MG/DL (ref 0.5–1.3)
EGFRCR SERPLBLD CKD-EPI 2021: 72 ML/MIN/1.73M*2
EOSINOPHIL # BLD AUTO: 0.02 X10*3/UL (ref 0–0.4)
EOSINOPHIL NFR BLD AUTO: 0.4 %
ERYTHROCYTE [DISTWIDTH] IN BLOOD BY AUTOMATED COUNT: 13.3 % (ref 11.5–14.5)
FLUAV RNA RESP QL NAA+PROBE: NOT DETECTED
FLUBV RNA RESP QL NAA+PROBE: NOT DETECTED
GLUCOSE SERPL-MCNC: 97 MG/DL (ref 74–99)
HCT VFR BLD AUTO: 40.7 % (ref 41–52)
HGB BLD-MCNC: 14 G/DL (ref 13.5–17.5)
HOLD SPECIMEN: NORMAL
HOLD SPECIMEN: NORMAL
IMM GRANULOCYTES # BLD AUTO: 0.01 X10*3/UL (ref 0–0.5)
IMM GRANULOCYTES NFR BLD AUTO: 0.2 % (ref 0–0.9)
INR PPP: 1.2 (ref 0.9–1.1)
LYMPHOCYTES # BLD AUTO: 1.62 X10*3/UL (ref 0.8–3)
LYMPHOCYTES NFR BLD AUTO: 34.4 %
MAGNESIUM SERPL-MCNC: 1.62 MG/DL (ref 1.6–2.4)
MCH RBC QN AUTO: 30.8 PG (ref 26–34)
MCHC RBC AUTO-ENTMCNC: 34.4 G/DL (ref 32–36)
MCV RBC AUTO: 90 FL (ref 80–100)
MONOCYTES # BLD AUTO: 0.66 X10*3/UL (ref 0.05–0.8)
MONOCYTES NFR BLD AUTO: 14 %
NEUTROPHILS # BLD AUTO: 2.38 X10*3/UL (ref 1.6–5.5)
NEUTROPHILS NFR BLD AUTO: 50.6 %
NRBC BLD-RTO: 0 /100 WBCS (ref 0–0)
P AXIS: 49 DEGREES
P OFFSET: 204 MS
P ONSET: 147 MS
PLATELET # BLD AUTO: 147 X10*3/UL (ref 150–450)
POTASSIUM SERPL-SCNC: 4.3 MMOL/L (ref 3.5–5.3)
PR INTERVAL: 164 MS
PROT SERPL-MCNC: 6.8 G/DL (ref 6.4–8.2)
PROTHROMBIN TIME: 13.8 SECONDS (ref 9.8–12.8)
Q ONSET: 229 MS
QRS COUNT: 10 BEATS
QRS DURATION: 72 MS
QT INTERVAL: 390 MS
QTC CALCULATION(BAZETT): 405 MS
QTC FREDERICIA: 400 MS
R AXIS: -31 DEGREES
RBC # BLD AUTO: 4.54 X10*6/UL (ref 4.5–5.9)
RSV RNA RESP QL NAA+PROBE: NOT DETECTED
SARS-COV-2 RNA RESP QL NAA+PROBE: NOT DETECTED
SODIUM SERPL-SCNC: 135 MMOL/L (ref 136–145)
T AXIS: 37 DEGREES
T OFFSET: 424 MS
UFH PPP CHRO-ACNC: 0.6 IU/ML
UFH PPP CHRO-ACNC: 1.2 IU/ML
UFH PPP CHRO-ACNC: 1.6 IU/ML
VENTRICULAR RATE: 65 BPM
WBC # BLD AUTO: 4.7 X10*3/UL (ref 4.4–11.3)

## 2024-03-19 PROCEDURE — 96374 THER/PROPH/DIAG INJ IV PUSH: CPT

## 2024-03-19 PROCEDURE — 84484 ASSAY OF TROPONIN QUANT: CPT | Performed by: REGISTERED NURSE

## 2024-03-19 PROCEDURE — 93005 ELECTROCARDIOGRAM TRACING: CPT

## 2024-03-19 PROCEDURE — 85520 HEPARIN ASSAY: CPT | Performed by: HOSPITALIST

## 2024-03-19 PROCEDURE — 99223 1ST HOSP IP/OBS HIGH 75: CPT | Performed by: HOSPITALIST

## 2024-03-19 PROCEDURE — 1200000002 HC GENERAL ROOM WITH TELEMETRY DAILY

## 2024-03-19 PROCEDURE — 99285 EMERGENCY DEPT VISIT HI MDM: CPT | Mod: 25

## 2024-03-19 PROCEDURE — 85025 COMPLETE CBC W/AUTO DIFF WBC: CPT | Performed by: REGISTERED NURSE

## 2024-03-19 PROCEDURE — 85610 PROTHROMBIN TIME: CPT | Performed by: REGISTERED NURSE

## 2024-03-19 PROCEDURE — 94640 AIRWAY INHALATION TREATMENT: CPT

## 2024-03-19 PROCEDURE — 36415 COLL VENOUS BLD VENIPUNCTURE: CPT | Performed by: HOSPITALIST

## 2024-03-19 PROCEDURE — 36415 COLL VENOUS BLD VENIPUNCTURE: CPT | Performed by: REGISTERED NURSE

## 2024-03-19 PROCEDURE — 70450 CT HEAD/BRAIN W/O DYE: CPT

## 2024-03-19 PROCEDURE — 87637 SARSCOV2&INF A&B&RSV AMP PRB: CPT | Performed by: REGISTERED NURSE

## 2024-03-19 PROCEDURE — 2500000002 HC RX 250 W HCPCS SELF ADMINISTERED DRUGS (ALT 637 FOR MEDICARE OP, ALT 636 FOR OP/ED): Performed by: HOSPITALIST

## 2024-03-19 PROCEDURE — 71045 X-RAY EXAM CHEST 1 VIEW: CPT | Performed by: RADIOLOGY

## 2024-03-19 PROCEDURE — 83735 ASSAY OF MAGNESIUM: CPT | Performed by: REGISTERED NURSE

## 2024-03-19 PROCEDURE — 71275 CT ANGIOGRAPHY CHEST: CPT

## 2024-03-19 PROCEDURE — 80053 COMPREHEN METABOLIC PANEL: CPT | Performed by: REGISTERED NURSE

## 2024-03-19 PROCEDURE — 85730 THROMBOPLASTIN TIME PARTIAL: CPT | Performed by: REGISTERED NURSE

## 2024-03-19 PROCEDURE — 70450 CT HEAD/BRAIN W/O DYE: CPT | Performed by: RADIOLOGY

## 2024-03-19 PROCEDURE — 2500000002 HC RX 250 W HCPCS SELF ADMINISTERED DRUGS (ALT 637 FOR MEDICARE OP, ALT 636 FOR OP/ED): Performed by: REGISTERED NURSE

## 2024-03-19 PROCEDURE — 2500000004 HC RX 250 GENERAL PHARMACY W/ HCPCS (ALT 636 FOR OP/ED): Performed by: REGISTERED NURSE

## 2024-03-19 PROCEDURE — 2500000001 HC RX 250 WO HCPCS SELF ADMINISTERED DRUGS (ALT 637 FOR MEDICARE OP): Performed by: HOSPITALIST

## 2024-03-19 PROCEDURE — 83880 ASSAY OF NATRIURETIC PEPTIDE: CPT | Performed by: REGISTERED NURSE

## 2024-03-19 PROCEDURE — 2550000001 HC RX 255 CONTRASTS: Performed by: REGISTERED NURSE

## 2024-03-19 PROCEDURE — 71045 X-RAY EXAM CHEST 1 VIEW: CPT

## 2024-03-19 RX ORDER — TAMSULOSIN HYDROCHLORIDE 0.4 MG/1
0.4 CAPSULE ORAL NIGHTLY
Status: DISCONTINUED | OUTPATIENT
Start: 2024-03-19 | End: 2024-03-21 | Stop reason: HOSPADM

## 2024-03-19 RX ORDER — HEPARIN SODIUM 10000 [USP'U]/100ML
0-4500 INJECTION, SOLUTION INTRAVENOUS CONTINUOUS
Status: DISCONTINUED | OUTPATIENT
Start: 2024-03-19 | End: 2024-03-21

## 2024-03-19 RX ORDER — HEPARIN SODIUM 5000 [USP'U]/ML
80 INJECTION, SOLUTION INTRAVENOUS; SUBCUTANEOUS ONCE
Status: COMPLETED | OUTPATIENT
Start: 2024-03-19 | End: 2024-03-19

## 2024-03-19 RX ORDER — TIOTROPIUM BROMIDE INHALATION SPRAY 3.12 UG/1
2 SPRAY, METERED RESPIRATORY (INHALATION) DAILY
COMMUNITY

## 2024-03-19 RX ORDER — FINASTERIDE 5 MG/1
5 TABLET, FILM COATED ORAL DAILY
Status: DISCONTINUED | OUTPATIENT
Start: 2024-03-19 | End: 2024-03-21 | Stop reason: HOSPADM

## 2024-03-19 RX ORDER — METOPROLOL SUCCINATE 25 MG/1
25 TABLET, EXTENDED RELEASE ORAL DAILY
Status: DISCONTINUED | OUTPATIENT
Start: 2024-03-19 | End: 2024-03-21 | Stop reason: HOSPADM

## 2024-03-19 RX ORDER — CLOPIDOGREL BISULFATE 75 MG/1
75 TABLET ORAL DAILY
Status: DISCONTINUED | OUTPATIENT
Start: 2024-03-19 | End: 2024-03-21 | Stop reason: HOSPADM

## 2024-03-19 RX ORDER — HEPARIN SODIUM 5000 [USP'U]/ML
3000-6000 INJECTION, SOLUTION INTRAVENOUS; SUBCUTANEOUS EVERY 4 HOURS PRN
Status: DISCONTINUED | OUTPATIENT
Start: 2024-03-19 | End: 2024-03-21

## 2024-03-19 RX ORDER — METOPROLOL SUCCINATE 50 MG/1
50 TABLET, EXTENDED RELEASE ORAL DAILY
Status: DISCONTINUED | OUTPATIENT
Start: 2024-03-19 | End: 2024-03-21 | Stop reason: HOSPADM

## 2024-03-19 RX ORDER — IPRATROPIUM BROMIDE AND ALBUTEROL SULFATE 2.5; .5 MG/3ML; MG/3ML
3 SOLUTION RESPIRATORY (INHALATION) EVERY 20 MIN
Status: COMPLETED | OUTPATIENT
Start: 2024-03-19 | End: 2024-03-19

## 2024-03-19 RX ORDER — ACETAMINOPHEN 325 MG/1
650 TABLET ORAL ONCE
Status: DISCONTINUED | OUTPATIENT
Start: 2024-03-19 | End: 2024-03-21 | Stop reason: HOSPADM

## 2024-03-19 RX ORDER — TAMSULOSIN HYDROCHLORIDE 0.4 MG/1
0.4 CAPSULE ORAL NIGHTLY
COMMUNITY

## 2024-03-19 RX ORDER — ROSUVASTATIN CALCIUM 20 MG/1
20 TABLET, COATED ORAL NIGHTLY
Status: DISCONTINUED | OUTPATIENT
Start: 2024-03-19 | End: 2024-03-21 | Stop reason: HOSPADM

## 2024-03-19 RX ADMIN — HEPARIN SODIUM 7250 UNITS: 5000 INJECTION INTRAVENOUS; SUBCUTANEOUS at 15:35

## 2024-03-19 RX ADMIN — IPRATROPIUM BROMIDE AND ALBUTEROL SULFATE 3 ML: 2.5; .5 SOLUTION RESPIRATORY (INHALATION) at 12:20

## 2024-03-19 RX ADMIN — TAMSULOSIN HYDROCHLORIDE 0.4 MG: 0.4 CAPSULE ORAL at 21:07

## 2024-03-19 RX ADMIN — IPRATROPIUM BROMIDE AND ALBUTEROL SULFATE 3 ML: 2.5; .5 SOLUTION RESPIRATORY (INHALATION) at 12:23

## 2024-03-19 RX ADMIN — ROSUVASTATIN CALCIUM 20 MG: 20 TABLET, FILM COATED ORAL at 21:07

## 2024-03-19 RX ADMIN — IOHEXOL 75 ML: 350 INJECTION, SOLUTION INTRAVENOUS at 13:49

## 2024-03-19 RX ADMIN — METHYLPREDNISOLONE SODIUM SUCCINATE 125 MG: 125 INJECTION, POWDER, FOR SOLUTION INTRAMUSCULAR; INTRAVENOUS at 11:45

## 2024-03-19 RX ADMIN — IPRATROPIUM BROMIDE AND ALBUTEROL SULFATE 3 ML: 2.5; .5 SOLUTION RESPIRATORY (INHALATION) at 12:22

## 2024-03-19 RX ADMIN — HEPARIN SODIUM 1600 UNITS/HR: 10000 INJECTION, SOLUTION INTRAVENOUS at 15:36

## 2024-03-19 RX ADMIN — FINASTERIDE 5 MG: 5 TABLET, FILM COATED ORAL at 18:28

## 2024-03-19 SDOH — SOCIAL STABILITY: SOCIAL INSECURITY: DOES ANYONE TRY TO KEEP YOU FROM HAVING/CONTACTING OTHER FRIENDS OR DOING THINGS OUTSIDE YOUR HOME?: NO

## 2024-03-19 SDOH — HEALTH STABILITY: MENTAL HEALTH: HOW OFTEN DO YOU HAVE A DRINK CONTAINING ALCOHOL?: MONTHLY OR LESS

## 2024-03-19 SDOH — SOCIAL STABILITY: SOCIAL INSECURITY: DO YOU FEEL ANYONE HAS EXPLOITED OR TAKEN ADVANTAGE OF YOU FINANCIALLY OR OF YOUR PERSONAL PROPERTY?: NO

## 2024-03-19 SDOH — SOCIAL STABILITY: SOCIAL INSECURITY: ARE YOU OR HAVE YOU BEEN THREATENED OR ABUSED PHYSICALLY, EMOTIONALLY, OR SEXUALLY BY ANYONE?: NO

## 2024-03-19 SDOH — HEALTH STABILITY: MENTAL HEALTH: HOW OFTEN DO YOU HAVE 6 OR MORE DRINKS ON ONE OCCASION?: NEVER

## 2024-03-19 SDOH — SOCIAL STABILITY: SOCIAL INSECURITY: HAS ANYONE EVER THREATENED TO HURT YOUR FAMILY OR YOUR PETS?: NO

## 2024-03-19 SDOH — HEALTH STABILITY: MENTAL HEALTH: HOW MANY STANDARD DRINKS CONTAINING ALCOHOL DO YOU HAVE ON A TYPICAL DAY?: 1 OR 2

## 2024-03-19 SDOH — SOCIAL STABILITY: SOCIAL INSECURITY: ARE THERE ANY APPARENT SIGNS OF INJURIES/BEHAVIORS THAT COULD BE RELATED TO ABUSE/NEGLECT?: NO

## 2024-03-19 SDOH — SOCIAL STABILITY: SOCIAL INSECURITY: DO YOU FEEL UNSAFE GOING BACK TO THE PLACE WHERE YOU ARE LIVING?: NO

## 2024-03-19 SDOH — SOCIAL STABILITY: SOCIAL INSECURITY: ABUSE: ADULT

## 2024-03-19 SDOH — SOCIAL STABILITY: SOCIAL INSECURITY: HAVE YOU HAD THOUGHTS OF HARMING ANYONE ELSE?: NO

## 2024-03-19 SDOH — SOCIAL STABILITY: SOCIAL INSECURITY: WERE YOU ABLE TO COMPLETE ALL THE BEHAVIORAL HEALTH SCREENINGS?: YES

## 2024-03-19 ASSESSMENT — COGNITIVE AND FUNCTIONAL STATUS - GENERAL
WALKING IN HOSPITAL ROOM: A LITTLE
TOILETING: A LITTLE
MOBILITY SCORE: 24
DAILY ACTIVITIY SCORE: 22
MOBILITY SCORE: 20
DRESSING REGULAR LOWER BODY CLOTHING: A LITTLE
MOVING TO AND FROM BED TO CHAIR: A LITTLE
PATIENT BASELINE BEDBOUND: NO
CLIMB 3 TO 5 STEPS WITH RAILING: A LITTLE
DAILY ACTIVITIY SCORE: 24
STANDING UP FROM CHAIR USING ARMS: A LITTLE

## 2024-03-19 ASSESSMENT — PAIN SCALES - GENERAL
PAINLEVEL_OUTOF10: 0 - NO PAIN
PAINLEVEL_OUTOF10: 9
PAINLEVEL_OUTOF10: 0 - NO PAIN

## 2024-03-19 ASSESSMENT — ACTIVITIES OF DAILY LIVING (ADL)
LACK_OF_TRANSPORTATION: NO
BATHING: INDEPENDENT
GROOMING: INDEPENDENT
ASSISTIVE_DEVICE: DENTURES UPPER;DENTURES LOWER
ADEQUATE_TO_COMPLETE_ADL: YES
WALKS IN HOME: INDEPENDENT
HEARING - LEFT EAR: FUNCTIONAL
FEEDING YOURSELF: INDEPENDENT
PATIENT'S MEMORY ADEQUATE TO SAFELY COMPLETE DAILY ACTIVITIES?: YES
JUDGMENT_ADEQUATE_SAFELY_COMPLETE_DAILY_ACTIVITIES: YES
DRESSING YOURSELF: INDEPENDENT
TOILETING: INDEPENDENT
HEARING - RIGHT EAR: FUNCTIONAL

## 2024-03-19 ASSESSMENT — PATIENT HEALTH QUESTIONNAIRE - PHQ9
2. FEELING DOWN, DEPRESSED OR HOPELESS: NOT AT ALL
1. LITTLE INTEREST OR PLEASURE IN DOING THINGS: NOT AT ALL
SUM OF ALL RESPONSES TO PHQ9 QUESTIONS 1 & 2: 0

## 2024-03-19 ASSESSMENT — LIFESTYLE VARIABLES
SKIP TO QUESTIONS 9-10: 1
SKIP TO QUESTIONS 9-10: 1
HOW MANY STANDARD DRINKS CONTAINING ALCOHOL DO YOU HAVE ON A TYPICAL DAY: 1 OR 2
HOW OFTEN DO YOU HAVE 6 OR MORE DRINKS ON ONE OCCASION: NEVER
AUDIT-C TOTAL SCORE: 1
HOW OFTEN DO YOU HAVE A DRINK CONTAINING ALCOHOL: MONTHLY OR LESS

## 2024-03-19 ASSESSMENT — PAIN DESCRIPTION - FREQUENCY: FREQUENCY: CONSTANT/CONTINUOUS

## 2024-03-19 ASSESSMENT — COLUMBIA-SUICIDE SEVERITY RATING SCALE - C-SSRS
1. IN THE PAST MONTH, HAVE YOU WISHED YOU WERE DEAD OR WISHED YOU COULD GO TO SLEEP AND NOT WAKE UP?: NO
1. IN THE PAST MONTH, HAVE YOU WISHED YOU WERE DEAD OR WISHED YOU COULD GO TO SLEEP AND NOT WAKE UP?: NO
2. HAVE YOU ACTUALLY HAD ANY THOUGHTS OF KILLING YOURSELF?: NO
6. HAVE YOU EVER DONE ANYTHING, STARTED TO DO ANYTHING, OR PREPARED TO DO ANYTHING TO END YOUR LIFE?: NO
6. HAVE YOU EVER DONE ANYTHING, STARTED TO DO ANYTHING, OR PREPARED TO DO ANYTHING TO END YOUR LIFE?: NO
2. HAVE YOU ACTUALLY HAD ANY THOUGHTS OF KILLING YOURSELF?: NO

## 2024-03-19 ASSESSMENT — PAIN - FUNCTIONAL ASSESSMENT
PAIN_FUNCTIONAL_ASSESSMENT: 0-10

## 2024-03-19 ASSESSMENT — PAIN DESCRIPTION - LOCATION: LOCATION: HEAD

## 2024-03-19 ASSESSMENT — PAIN DESCRIPTION - PAIN TYPE: TYPE: ACUTE PAIN

## 2024-03-19 ASSESSMENT — PAIN DESCRIPTION - DESCRIPTORS: DESCRIPTORS: ACHING

## 2024-03-19 NOTE — TELEPHONE ENCOUNTER
Message left that pt was very lightheaded yesterday and not feeling well.  Per message pt is sleep now.  Asking for return call as they would like to know if pt should go to the ER.    Return call to family, they states that pt keep telling them that he is not feeling well something is wrong.  Pt is still has c/o dizziness this morning.  Pt bp yesterday went from 149/80 to 112/59.  Per family this is not like pt.  Advised that pt should be seen in the ER for full evaluation.  Family verbalized an understanding.

## 2024-03-19 NOTE — ED PROVIDER NOTES
HPI   Chief Complaint   Patient presents with    Dizziness     Dizziness x 2 days.         History provided by:  Patient and spouse   used: No      79-year-old male with past medical history significant for proximal atrial fibrillation, COPD not on oxygen, CAD status post percutaneous angioplasty, and dyslipidemia presents emergency department today for evaluation of feeling dizzy for 2 days.  Patient tells me that he woke up yesterday morning feeling dizzy.  When I asked patient to elaborate on what dizziness means to him he tells me that he felt like he was going to pass out.  Patient denies feeling like the room was spinning.  Patient's wife was at bedside also tells me that patient has been experiencing headache and has been short of breath when he lays down.  She tells me that she has been putting a pillow behind him to sleep.  Patient denies being on a diuretic.  Patient denies any cough congestion, fever or chills.  Patient is wife tells me that she did take the blood pressure yesterday while at home and noted it to be labile.  She tells me she was going to bring them last night however she opted to bring him today.  Patient's wife tells me that he is supposed be taking Spiriva however he has been reluctant to do so because the packaging says to be cautious when you have hypertension using this medication.  Patient does endorsed using his rescue inhaler.                  [place] Coma Scale Score: 15                     Patient History   Past Medical History:   Diagnosis Date    Atypical chest pain     CAD (coronary artery disease)     s/p percutaneous angioplasty    COPD (chronic obstructive pulmonary disease) (CMS/HCC)     Dyslipidemia     Palpitations     Paroxysmal atrial fibrillation (CMS/HCC)     SOB (shortness of breath) on exertion      Past Surgical History:   Procedure Laterality Date    OTHER SURGICAL HISTORY  04/09/2022    Appendectomy    OTHER SURGICAL HISTORY  04/11/2022     Cataract surgery    OTHER SURGICAL HISTORY  2022    Cardiac catheterization with stent placement     Family History   Problem Relation Name Age of Onset    Cancer Mother      Cancer Father      Diabetes type II Sister       Social History     Tobacco Use    Smoking status: Former     Packs/day: 1.00     Years: 50.00     Additional pack years: 0.00     Total pack years: 50.00     Types: Cigarettes     Quit date:      Years since quittin.2    Smokeless tobacco: Never   Vaping Use    Vaping Use: Never used   Substance Use Topics    Alcohol use: Never    Drug use: Never       Physical Exam   ED Triage Vitals [24 1104]   Temperature Heart Rate Respirations BP   36.9 °C (98.4 °F) 61 20 151/75      Pulse Ox Temp Source Heart Rate Source Patient Position   95 % Temporal Monitor Sitting      BP Location FiO2 (%)     Right arm --       Physical Exam  Vitals and nursing note reviewed.   Constitutional:       Appearance: Normal appearance.   Cardiovascular:      Rate and Rhythm: Normal rate and regular rhythm.      Pulses: Normal pulses.      Heart sounds: Normal heart sounds.   Pulmonary:      Effort: Pulmonary effort is normal.      Breath sounds: Examination of the right-middle field reveals wheezing. Examination of the left-middle field reveals wheezing. Examination of the right-lower field reveals wheezing. Examination of the left-lower field reveals wheezing. Decreased breath sounds and wheezing present.   Abdominal:      Palpations: Abdomen is soft.   Skin:     General: Skin is warm and dry.      Capillary Refill: Capillary refill takes less than 2 seconds.   Neurological:      General: No focal deficit present.      Mental Status: He is alert and oriented to person, place, and time.   Psychiatric:         Mood and Affect: Mood normal.         Behavior: Behavior normal.         ED Course & MDM   Diagnoses as of 24 1513   Orthopnea   Pulmonary embolism, unspecified chronicity, unspecified  pulmonary embolism type, unspecified whether acute cor pulmonale present (CMS/HCC)   Other acute pulmonary embolism without acute cor pulmonale (CMS/HCC)       Medical Decision Making  Patient seen examined emergency department; patient is chronically ill in appearance but does not appear to be in any acute distress.  Patient has nonpitting edema to bilateral lower extremities heart regular rate and rhythm without any murmur noted.  Patient's lung sounds are diminished and has wheezing noted in the middle lobes.  Will obtain orthostatic vital signs, chest x-ray, EKG and troponin to rule out acute ACS.  Will also obtain micro swabs, CBC, CMP, BNP, and urinalysis.  Patient given IV Solu-Medrol and breathing treatments for wheezing.    I did review patient's chart and it does appear that he had a stress chest done on 912/23 stress test was noted to have normal perfusion patient's EF was 76%.    EKG at 11:28 with ventricular rate of 65, as interpreted by me, shows a normal rate and rhythm, left axis deviation, normal intervals. And normal ST and T wave pattern with no evidence of acute ischemia or other acute findings.    Patient's high sensitive troponins have been negative x 2.  Chest x-ray without any acute cardiopulmonary processes.  Micro scrubs are both negative BNP is slightly elevated at 171 CMP significant for sodium of 135, magnesium is 162, CBC is unremarkable.  PT/INR 13/1.2.  Patient's head CT without any acute cortical infarct.    I did reevaluate patient he tells me he is still feeling lightheaded.  Reauscultation of lung sounds demonstrates worsening wheezing despite steroids and breathing treatments.  Will order CT angio of the chest to rule out PE and to further evaluate for pleural effusion/pneumonia.    CT angio of the chest is positive for small bilateral lower pulmonary emboli without signs of heart strain.    I did go update patient and his wife on the findings of PEs.  Reviewed bleeding risks  with patient and will initiate high-intensity heparin drip.  Patient will be admitted at this time him and his wife are both agreeable.  Hospitalist team contacted for admission.   Labs Reviewed   CBC WITH AUTO DIFFERENTIAL - Abnormal       Result Value    WBC 4.7      nRBC 0.0      RBC 4.54      Hemoglobin 14.0      Hematocrit 40.7 (*)     MCV 90      MCH 30.8      MCHC 34.4      RDW 13.3      Platelets 147 (*)     Neutrophils % 50.6      Immature Granulocytes %, Automated 0.2      Lymphocytes % 34.4      Monocytes % 14.0      Eosinophils % 0.4      Basophils % 0.4      Neutrophils Absolute 2.38      Immature Granulocytes Absolute, Automated 0.01      Lymphocytes Absolute 1.62      Monocytes Absolute 0.66      Eosinophils Absolute 0.02      Basophils Absolute 0.02     COMPREHENSIVE METABOLIC PANEL - Abnormal    Glucose 97      Sodium 135 (*)     Potassium 4.3      Chloride 103      Bicarbonate 28      Anion Gap 8 (*)     Urea Nitrogen 11      Creatinine 1.05      eGFR 72      Calcium 8.4 (*)     Albumin 3.5      Alkaline Phosphatase 78      Total Protein 6.8      AST 20      Bilirubin, Total 0.6      ALT 13     B-TYPE NATRIURETIC PEPTIDE - Abnormal     (*)     Narrative:        <100 pg/mL - Heart failure unlikely  100-299 pg/mL - Intermediate probability of acute heart                  failure exacerbation. Correlate with clinical                  context and patient history.    >=300 pg/mL - Heart Failure likely. Correlate with clinical                  context and patient history.    BNP testing is performed using different testing methodology at [facility] than at other [facility]. Direct result comparisons should only be made within the same method.      PROTIME-INR - Abnormal    Protime 13.8 (*)     INR 1.2 (*)    MAGNESIUM - Normal    Magnesium 1.62     SARS-COV-2 AND INFLUENZA A/B PCR - Normal    Flu A Result Not Detected      Flu B Result Not Detected      Coronavirus 2019, PCR  Not Detected      Narrative:     This assay has received FDA Emergency Use Authorization (EUA) and  is only authorized for the duration of time that circumstances exist to justify the authorization of the emergency use of in vitro diagnostic tests for the detection of SARS-CoV-2 virus and/or diagnosis of COVID-19 infection under section 564(b)(1) of the Act, 21 U.S.C. 360bbb-3(b)(1). Testing for SARS-CoV-2 is only recommended for patients who meet current clinical and/or epidemiological criteria as defined by federal, state, or local public health directives. This assay is an in vitro diagnostic nucleic acid amplification test for the qualitative detection of SARS-CoV-2, Influenza A, and Influenza B from nasopharyngeal specimens and has been validated for use at [facility]. Negative results do not preclude COVID-19 infections or Influenza A/B infections, and should not be used as the sole basis for diagnosis, treatment, or other management decisions. If Influenza A/B and RSV PCR results are negative, testing for Parainfluenza virus, Adenovirus and Metapneumovirus is routinely performed for [facility] pediatric oncology and intensive care inpatients, and is available on other patients by placing an add-on request.    RSV PCR - Normal    RSV PCR Not Detected      Narrative:     This assay is an FDA-cleared, in vitro diagnostic nucleic acid amplification test for the detection of RSV from nasopharyngeal specimens, and has been validated for use at [facility]. Negative results do not preclude RSV infections, and should not be used as the sole basis for diagnosis, treatment, or other management decisions. If Influenza A/B and RSV PCR results are negative, testing for Parainfluenza virus, Adenovirus and Metapneumovirus is routinely performed for pediatric oncology and intensive care inpatients at [facility], and is available on other patients by placing an add-on request.       SERIAL  TROPONIN-INITIAL - Normal    Troponin I, High Sensitivity 10      Narrative:     Less than 99th percentile of normal range cutoff-  Female and children under 18 years old <14 ng/L; Male <21 ng/L: Negative  Repeat testing should be performed if clinically indicated.     Female and children under 18 years old 14-50 ng/L; Male 21-50 ng/L:  Consistent with possible cardiac damage and possible increased clinical   risk. Serial measurements may help to assess extent of myocardial damage.     >50 ng/L: Consistent with cardiac damage, increased clinical risk and  myocardial infarction. Serial measurements may help assess extent of   myocardial damage.      NOTE: Children less than 1 year old may have higher baseline troponin   levels and results should be interpreted in conjunction with the overall   clinical context.     NOTE: Troponin I testing is performed using a different   testing methodology at [facility] than at other   [facility]. Direct result comparisons should only   be made within the same method.   SERIAL TROPONIN, 1 HOUR - Normal    Troponin I, High Sensitivity 9      Narrative:     Less than 99th percentile of normal range cutoff-  Female and children under 18 years old <14 ng/L; Male <21 ng/L: Negative  Repeat testing should be performed if clinically indicated.     Female and children under 18 years old 14-50 ng/L; Male 21-50 ng/L:  Consistent with possible cardiac damage and possible increased clinical   risk. Serial measurements may help to assess extent of myocardial damage.     >50 ng/L: Consistent with cardiac damage, increased clinical risk and  myocardial infarction. Serial measurements may help assess extent of   myocardial damage.      NOTE: Children less than 1 year old may have higher baseline troponin   levels and results should be interpreted in conjunction with the overall   clinical context.     NOTE: Troponin I testing is performed using a different   testing methodology  at [facility] than at other   [facility]. Direct result comparisons should only   be made within the same method.   TROPONIN SERIES- (INITIAL, 1 HR)    Narrative:     The following orders were created for panel order Troponin I Series, High Sensitivity (0, 1 HR).  Procedure                               Abnormality         Status                     ---------                               -----------         ------                     Troponin I, High Sensiti...[[id number]]  Normal              Final result               Troponin, High Sensitivi...[[id number]]  Normal              Final result                 Please view results for these tests on the individual orders.       CT angio chest for pulmonary embolism   Final Result   Exam is positive for small bilateral lower pulmonary emboli. No signs   of right heart strain.        Slight pulmonary basilar heterogeneity, possible mosaic attenuation   related to small vessel or small airways disease.        1.3 cm spiculated right upper lobe nodule is stable dating back to   07/18/2017 consistent with benign etiology.        Sequela of remote granulomatous infection.        Additional findings as described above.        MACRO:   None.        Signed by: Kita Craig 3/19/2024 2:37 PM   Dictation workstation:   [id number]      CT head wo IV contrast   Final Result   No evidence of acute cortical infarct or intracranial hemorrhage.        MACRO:   None             Signed by: Cooper Arnett 3/19/2024 1:16 PM   Dictation workstation:   [id number]      XR chest 1 view   Final Result   No evidence of superimposed acute cardiopulmonary process.        MACRO:   None        Signed by: Chet Griggs 3/19/2024 11:39 AM   Dictation workstation:   [id number]          Procedure  Procedures     Dulce Houston, YANETH-CNP  03/19/24 1513

## 2024-03-19 NOTE — H&P
History and Physical        ASSESSMENT AND PLAN:     Acute bilateral pulmonary embolism  Intermittent lightheadedness  -P/W multiple episodes of lightheadedness and worsening shortness of breath  -CT angio of chest demonstrates small bilateral Pulmonary embolism; no evidence of RV strain  -Etiology still remains unclear, this is most likely unprovoked    Plan:  -Will obtain ultrasound of lower extremity  -Obtain echocardiogram to evaluate for RV strain  -Continue IV heparin  -Will obtain orthostatic vitals.      History of coronary artery disease status post stenting  -ECG shows normal sinus rhythm, troponins negative.  -Nuclear stress test done in 2023 shows no evidence of ischemia  -Continue Plavix    History of BPH  -Continue finasteride    History of abdominal aortic aneurysm  -Stable will need outpatient follow-up    History of COPD  Pulmonary nodule, stable  -Continue outpatient workup      Abdominal aortic aneurysm  -Follow-up outpatient    History of paroxysmal atrial fibrillation  -He had 1 episode of paroxysmal atrial fibrillation in 2022 and converted on his own since then has been in sinus rhythm.  -He was discharged on room Xarelto at some point and it was discontinued.        VTE Prophylaxis: Heparin      Jada Mtz MD    HISTORY OF PRESENT ILLNESS:   Chief Complaint: Dizziness, shortness of breath    History Of Present Illness:    Femi Wilcox is a 79 y.o. male with a significant past medical history of COPD, history of coronary artery disease, abdominal aortic aneurysm, BPH, history of ischemic colitis who presented to the emergency room with worsening dizziness.    He states that his dizziness started 24 hours ago, started having several episodes of lightheadedness and dizziness particularly worse with ambulation which prompted him to come to the emergency room.    His wife states that ever since he was diagnosed in November with bronchitis and was hospitalized with bronchitis he has  not felt the same.  His shortness of breath has continued to progressively get worse.    When asked about lower extremity swelling patient states that he noticed approximately 2 weeks ago he started having lower extremity pain.    In the emergency room, he had a CT angio of chest which showed small bilateral pulmonary embolism.    He was started on IV heparin.    He denies any recent prolonged travel, he did fly to [place] in December which was only an hour and a half flight.  His wife does state that because of his chronic shortness of breath that he has been less mobile.    He currently denies dizziness, denies syncope, presyncope.  Denies nausea, vomiting denies abdominal pain.         Review of systems: 10 point review of systems is otherwise negative except as mentioned above.    PAST HISTORIES:       Past Medical History:  He has a past medical history of Atypical chest pain, CAD (coronary artery disease), COPD (chronic obstructive pulmonary disease) (CMS/HCC), Dyslipidemia, Palpitations, Paroxysmal atrial fibrillation (CMS/HCC), and SOB (shortness of breath) on exertion.    Past Surgical History:  He has a past surgical history that includes Other surgical history (04/09/2022); Other surgical history (04/11/2022); and Other surgical history (04/11/2022).      Social History:  He reports that he quit smoking about 5 years ago. His smoking use included cigarettes. He has a 50.00 pack-year smoking history. He has never used smokeless tobacco. He reports that he does not drink alcohol and does not use drugs.    Family History:  Family History   Problem Relation Name Age of Onset    Cancer Mother      Cancer Father      Diabetes type II Sister          Allergies:  Patient has no known allergies.    OBJECTIVE:       Last Recorded Vitals:  Vitals:    03/19/24 1242 03/19/24 1400 03/19/24 1500 03/19/24 1600   BP:  (!) 164/119 180/87 153/75   BP Location:       Patient Position:       Pulse:  68 72 60   Resp:        Temp:       TempSrc:       SpO2: 98% 95% 95% 95%   Weight:       Height:           Last I/O:  No intake/output data recorded.    Physical Exam    PHYSICAL EXAM:   GENERAL: Laying in bed, does not appear to be in any distress.   HEENT: HEAD: Normocephalic atraumatic.  Neck: Supple.  Eyes: Pupils are reactive to direct light.   CVS: S1, S2 heard. Regular rate and rhythm  LUNGS: Clear to auscultate bilaterally. No wheezing or rhonchi appreciated.  ABDOMEN: Soft, nontender to palpate. Positive bowel sounds. No guarding or rebound appreciated.  NEUROLOGICAL: No focal neurological deficits appreciated. Cranial nerves are grossly intact.  EXTREMITIES: Dorsalis pedis pulses can be appreciated. No edema appreciated.  SKIN:  Grossly intact, warm and dry.          Scheduled Medications  acetaminophen, 650 mg, oral, Once      PRN Medications  PRN medications: heparin  Continuous Medications  heparin, 0-4,500 Units/hr, Last Rate: 1,600 Units/hr (03/19/24 1536)        Outpatient Medications:  Prior to Admission medications    Medication Sig Start Date End Date Taking? Authorizing Provider   albuterol 90 mcg/actuation aerosol powdr breath activated inhaler Inhale 2 puffs every 6 hours if needed for wheezing.    Historical Provider, MD   clopidogrel (Plavix) 75 mg tablet Take 1 tablet (75 mg) by mouth once daily.    Historical Provider, MD   finasteride (Proscar) 5 mg tablet Take 1 tablet (5 mg) by mouth once daily.    Historical Provider, MD   lisinopril 20 mg tablet Take 1 tablet (20 mg) by mouth once daily.    Historical Provider, MD   metoprolol succinate XL (Toprol-XL) 25 mg 24 hr tablet Take 1 tablet (25 mg) by mouth once daily.    Historical Provider, MD   metoprolol succinate XL (Toprol-XL) 50 mg 24 hr tablet Take 1 tablet (50 mg) by mouth once daily. In addition to 25mg script for total of 75mg once daily    Historical Provider, MD   nitroglycerin (Nitrostat) 0.4 mg SL tablet Place 1 tablet (0.4 mg) under the tongue  every 5 minutes if needed for chest pain.  Patient taking differently: Place 1 tablet (0.4 mg) under the tongue every 5 minutes if needed for chest pain. Up to 3 doses 10/20/23   Daisy Can MD   rosuvastatin (Crestor) 20 mg tablet Take 1 tablet (20 mg) by mouth once daily. 12/28/22   Historical Provider, MD   tamsulosin (Flomax) 0.4 mg 24 hr capsule Take 1 capsule (0.4 mg) by mouth once daily at bedtime.    Historical Provider, MD   tiotropium (Spiriva Respimat) 2.5 mcg/actuation inhaler Inhale 2 puffs once daily.    Historical Provider, MD       LABS AND IMAGING:     Labs:  Results for orders placed or performed during the hospital encounter of 03/19/24 (from the past 24 hour(s))   ECG 12 lead   Result Value Ref Range    Ventricular Rate 65 BPM    Atrial Rate 65 BPM    NM Interval 164 ms    QRS Duration 72 ms    QT Interval 390 ms    QTC Calculation(Bazett) 405 ms    P Axis 49 degrees    R Axis -31 degrees    T Axis 37 degrees    QRS Count 10 beats    Q Onset 229 ms    P Onset 147 ms    P Offset 204 ms    T Offset 424 ms    QTC Fredericia 400 ms   CBC and Auto Differential   Result Value Ref Range    WBC 4.7 4.4 - 11.3 x10*3/uL    nRBC 0.0 0.0 - 0.0 /100 WBCs    RBC 4.54 4.50 - 5.90 x10*6/uL    Hemoglobin 14.0 13.5 - 17.5 g/dL    Hematocrit 40.7 (L) 41.0 - 52.0 %    MCV 90 80 - 100 fL    MCH 30.8 26.0 - 34.0 pg    MCHC 34.4 32.0 - 36.0 g/dL    RDW 13.3 11.5 - 14.5 %    Platelets 147 (L) 150 - 450 x10*3/uL    Neutrophils % 50.6 40.0 - 80.0 %    Immature Granulocytes %, Automated 0.2 0.0 - 0.9 %    Lymphocytes % 34.4 13.0 - 44.0 %    Monocytes % 14.0 2.0 - 10.0 %    Eosinophils % 0.4 0.0 - 6.0 %    Basophils % 0.4 0.0 - 2.0 %    Neutrophils Absolute 2.38 1.60 - 5.50 x10*3/uL    Immature Granulocytes Absolute, Automated 0.01 0.00 - 0.50 x10*3/uL    Lymphocytes Absolute 1.62 0.80 - 3.00 x10*3/uL    Monocytes Absolute 0.66 0.05 - 0.80 x10*3/uL    Eosinophils Absolute 0.02 0.00 - 0.40 x10*3/uL    Basophils Absolute  0.02 0.00 - 0.10 x10*3/uL   Comprehensive Metabolic Panel   Result Value Ref Range    Glucose 97 74 - 99 mg/dL    Sodium 135 (L) 136 - 145 mmol/L    Potassium 4.3 3.5 - 5.3 mmol/L    Chloride 103 98 - 107 mmol/L    Bicarbonate 28 21 - 32 mmol/L    Anion Gap 8 (L) 10 - 20 mmol/L    Urea Nitrogen 11 6 - 23 mg/dL    Creatinine 1.05 0.50 - 1.30 mg/dL    eGFR 72 >60 mL/min/1.73m*2    Calcium 8.4 (L) 8.6 - 10.3 mg/dL    Albumin 3.5 3.4 - 5.0 g/dL    Alkaline Phosphatase 78 33 - 136 U/L    Total Protein 6.8 6.4 - 8.2 g/dL    AST 20 9 - 39 U/L    Bilirubin, Total 0.6 0.0 - 1.2 mg/dL    ALT 13 10 - 52 U/L   Magnesium   Result Value Ref Range    Magnesium 1.62 1.60 - 2.40 mg/dL   B-Type Natriuretic Peptide   Result Value Ref Range     (H) 0 - 99 pg/mL   Protime-INR   Result Value Ref Range    Protime 13.8 (H) 9.8 - 12.8 seconds    INR 1.2 (H) 0.9 - 1.1   Sars-CoV-2 and Influenza A/B PCR   Result Value Ref Range    Flu A Result Not Detected Not Detected    Flu B Result Not Detected Not Detected    Coronavirus 2019, PCR Not Detected Not Detected   RSV PCR   Result Value Ref Range    RSV PCR Not Detected Not Detected   Troponin I, High Sensitivity, Initial   Result Value Ref Range    Troponin I, High Sensitivity 10 0 - 20 ng/L   aPTT - baseline   Result Value Ref Range    aPTT 28 27 - 38 seconds   Troponin, High Sensitivity, 1 Hour   Result Value Ref Range    Troponin I, High Sensitivity 9 0 - 20 ng/L        Imaging:  CT angio chest for pulmonary embolism  Narrative: Interpreted By:  Kita Craig,   STUDY:  CT ANGIO CHEST FOR PULMONARY EMBOLISM;  3/19/2024 1:53 pm      INDICATION:  Signs/Symptoms:sob.      COMPARISON:  12/06/2023      ACCESSION NUMBER(S):  [id number]      ORDERING CLINICIAN:  MAGALI MORA      TECHNIQUE:  CT of the chest was performed. Sagittal and coronal reconstructions  were generated.  75 cc Omnipaque 350 intravenous contrast given for  the examination.  Multiplanar reconstructions of the  pulmonary  vessels were created on an independent workstation and provided for  review.      FINDINGS:          CHEST WALL AND LOWER NECK: Diminutive left thyroid lobe. No  significant axillary lymphadenopathy.      MEDIASTINUM AND ROCIO:  Coarse subcentimeter presumed chris  calcifications in the right hilum. No significant noncalcified  mediastinal or hilar lymphadenopathy.      HEART AND VESSELS:  Small nearly occlusive filling defect in the  posterior segmental branch of the left lower lobe pulmonary artery.  Additional small nonocclusive filling defect/PE in the lateral  segmental branch of the left lower lobe pulmonary artery and the  right lower lobar and segmental pulmonary artery branches. No signs  of right heart strain. The heart is normal in size. No significant  pericardial effusion. Multifocal atherosclerotic calcifications  including the coronary arteries.      LUNGS, PLEURA, LARGE AIRWAYS:  Bullous/emphysematous changes and  coarse patchy and linear presumed scarring in both lung apices.  Spiculated 1.3 cm right upper lobe nodule image 98/314 is similar to  the previous exam as well as 02/18/2022 and 07/18/2017. Multiple  calcified granulomas scattered in both lung fields. Subtle  heterogeneity/mosaic attenuation of both lung bases. No significant  pleural effusion. The central airways are patent.      UPPER ABDOMEN:  Punctate calcified splenic granulomas. Rounded  hypodensities arising from partially imaged bilateral kidneys. Dense  probable excreted/previously administered contrast material in  partially imaged nondilated proximal renal collecting systems.      BONES:  Diffuse osteopenia. Degenerative changes of the visualized  spine.      Impression: Exam is positive for small bilateral lower pulmonary emboli. No signs  of right heart strain.      Slight pulmonary basilar heterogeneity, possible mosaic attenuation  related to small vessel or small airways disease.      1.3 cm spiculated right upper  lobe nodule is stable dating back to  07/18/2017 consistent with benign etiology.      Sequela of remote granulomatous infection.      Additional findings as described above.      MACRO:  None.      Signed by: Kita Craig 3/19/2024 2:37 PM  Dictation workstation:   [id number]  CT head wo IV contrast  Narrative: Interpreted By:  Cooper Arnett,   STUDY:  CT HEAD WO IV CONTRAST; 3/19/2024 12:54 pm      INDICATION:  Signs/Symptoms:lightheaded.      COMPARISON:  CT head dated 12/02/2021      ACCESSION NUMBER(S):  [id number]      ORDERING CLINICIAN:  MAGALI MORA      TECHNIQUE:  Contiguous axial CT images were obtained through the head at 5 mm  slice thickness without contrast administration.      FINDINGS:  INTRACRANIAL:  The ventricles, sulci and basal cisterns are within normal limits for  size and configuration. The grey-white differentiation is intact.  There is no mass effect or midline shift. There is no extraaxial  fluid collection. There is no intracranial hemorrhage.  The calvarium  is unremarkable.      EXTRACRANIAL:  Visualized paranasal sinuses and mastoids are clear.      Impression: No evidence of acute cortical infarct or intracranial hemorrhage.      MACRO:  None          Signed by: Cooper Arnett 3/19/2024 1:16 PM  Dictation workstation:   [id number]  ECG 12 lead  Normal sinus rhythm  Left axis deviation  Abnormal ECG  When compared with ECG of 19-FEB-2022 06:31,  No significant change was found  See ED provider note for full interpretation and clinical correlation  Confirmed by Joann Lomas ([id number]) on 3/19/2024 12:21:17 PM  XR chest 1 view  Narrative: Interpreted By:  Chet Griggs,   STUDY:  XR CHEST 1 VIEW;  3/19/2024 11:32 am      INDICATION:  Signs/Symptoms:Chest Pain.      COMPARISON:  CT chest dated 12/06/2023 and chest radiograph dated 02/18/2022.      ACCESSION NUMBER(S):  [id number]      ORDERING CLINICIAN:  MAGALI MORA      FINDINGS:  AP radiograph of the chest was  provided.      DEVICES:  None.      CARDIOMEDIASTINAL SILHOUETTE:  Cardiomediastinal silhouette is normal in size and configuration.  Aortic atherosclerotic calcification.      LUNGS:  Similar appearance of a few nodular opacities within the right  hemithorax, more accurately characterized on previous CT, with  several representing calcified granulomas. There is also similar  biapical pleuroparenchymal scarring. No definite superimposed acute  airspace opacity. No pneumothorax. No pleural effusion.      ABDOMEN:  No remarkable upper abdominal findings.      BONES:  No acute osseous changes.      Impression: No evidence of superimposed acute cardiopulmonary process.      MACRO:  None      Signed by: Chet Griggs 3/19/2024 11:39 AM  Dictation workstation:   [id number]

## 2024-03-20 ENCOUNTER — APPOINTMENT (OUTPATIENT)
Dept: CARDIAC REHAB | Facility: HOSPITAL | Age: 79
End: 2024-03-20
Payer: MEDICARE

## 2024-03-20 ENCOUNTER — APPOINTMENT (OUTPATIENT)
Dept: RADIOLOGY | Facility: HOSPITAL | Age: 79
DRG: 176 | End: 2024-03-20
Payer: MEDICARE

## 2024-03-20 LAB
ANION GAP SERPL CALC-SCNC: 11 MMOL/L (ref 10–20)
BUN SERPL-MCNC: 18 MG/DL (ref 6–23)
CALCIUM SERPL-MCNC: 8.7 MG/DL (ref 8.6–10.3)
CARDIAC TROPONIN I PNL SERPL HS: 7 NG/L (ref 0–20)
CHLORIDE SERPL-SCNC: 103 MMOL/L (ref 98–107)
CO2 SERPL-SCNC: 25 MMOL/L (ref 21–32)
CREAT SERPL-MCNC: 0.88 MG/DL (ref 0.5–1.3)
EGFRCR SERPLBLD CKD-EPI 2021: 87 ML/MIN/1.73M*2
ERYTHROCYTE [DISTWIDTH] IN BLOOD BY AUTOMATED COUNT: 13.3 % (ref 11.5–14.5)
GLUCOSE SERPL-MCNC: 135 MG/DL (ref 74–99)
HCT VFR BLD AUTO: 42.2 % (ref 41–52)
HGB BLD-MCNC: 14.6 G/DL (ref 13.5–17.5)
HOLD SPECIMEN: NORMAL
HOLD SPECIMEN: NORMAL
MAGNESIUM SERPL-MCNC: 1.91 MG/DL (ref 1.6–2.4)
MCH RBC QN AUTO: 30.4 PG (ref 26–34)
MCHC RBC AUTO-ENTMCNC: 34.6 G/DL (ref 32–36)
MCV RBC AUTO: 88 FL (ref 80–100)
NRBC BLD-RTO: 0 /100 WBCS (ref 0–0)
PLATELET # BLD AUTO: 158 X10*3/UL (ref 150–450)
POTASSIUM SERPL-SCNC: 4.3 MMOL/L (ref 3.5–5.3)
RBC # BLD AUTO: 4.8 X10*6/UL (ref 4.5–5.9)
SODIUM SERPL-SCNC: 135 MMOL/L (ref 136–145)
UFH PPP CHRO-ACNC: 0.6 IU/ML
UFH PPP CHRO-ACNC: 0.6 IU/ML
UFH PPP CHRO-ACNC: 0.8 IU/ML
WBC # BLD AUTO: 4.6 X10*3/UL (ref 4.4–11.3)

## 2024-03-20 PROCEDURE — 36415 COLL VENOUS BLD VENIPUNCTURE: CPT | Performed by: HOSPITALIST

## 2024-03-20 PROCEDURE — 80048 BASIC METABOLIC PNL TOTAL CA: CPT | Performed by: HOSPITALIST

## 2024-03-20 PROCEDURE — 93970 EXTREMITY STUDY: CPT | Performed by: RADIOLOGY

## 2024-03-20 PROCEDURE — 99232 SBSQ HOSP IP/OBS MODERATE 35: CPT | Performed by: HOSPITALIST

## 2024-03-20 PROCEDURE — 85520 HEPARIN ASSAY: CPT | Performed by: HOSPITALIST

## 2024-03-20 PROCEDURE — 2500000001 HC RX 250 WO HCPCS SELF ADMINISTERED DRUGS (ALT 637 FOR MEDICARE OP): Performed by: HOSPITALIST

## 2024-03-20 PROCEDURE — 93970 EXTREMITY STUDY: CPT

## 2024-03-20 PROCEDURE — 84484 ASSAY OF TROPONIN QUANT: CPT | Performed by: HOSPITALIST

## 2024-03-20 PROCEDURE — 85027 COMPLETE CBC AUTOMATED: CPT | Performed by: HOSPITALIST

## 2024-03-20 PROCEDURE — 83735 ASSAY OF MAGNESIUM: CPT | Performed by: HOSPITALIST

## 2024-03-20 PROCEDURE — 2500000004 HC RX 250 GENERAL PHARMACY W/ HCPCS (ALT 636 FOR OP/ED): Performed by: HOSPITALIST

## 2024-03-20 PROCEDURE — 1200000002 HC GENERAL ROOM WITH TELEMETRY DAILY

## 2024-03-20 PROCEDURE — 2500000002 HC RX 250 W HCPCS SELF ADMINISTERED DRUGS (ALT 637 FOR MEDICARE OP, ALT 636 FOR OP/ED): Performed by: HOSPITALIST

## 2024-03-20 RX ADMIN — METOPROLOL SUCCINATE 25 MG: 25 TABLET, EXTENDED RELEASE ORAL at 08:43

## 2024-03-20 RX ADMIN — ROSUVASTATIN CALCIUM 20 MG: 20 TABLET, FILM COATED ORAL at 20:35

## 2024-03-20 RX ADMIN — METOPROLOL SUCCINATE 50 MG: 50 TABLET, EXTENDED RELEASE ORAL at 08:43

## 2024-03-20 RX ADMIN — HEPARIN SODIUM 10 UNITS/HR: 10000 INJECTION, SOLUTION INTRAVENOUS at 18:20

## 2024-03-20 RX ADMIN — TAMSULOSIN HYDROCHLORIDE 0.4 MG: 0.4 CAPSULE ORAL at 20:35

## 2024-03-20 RX ADMIN — FINASTERIDE 5 MG: 5 TABLET, FILM COATED ORAL at 08:45

## 2024-03-20 RX ADMIN — TIOTROPIUM BROMIDE INHALATION SPRAY 2 PUFF: 3.12 SPRAY, METERED RESPIRATORY (INHALATION) at 08:45

## 2024-03-20 RX ADMIN — CLOPIDOGREL 75 MG: 75 TABLET ORAL at 08:45

## 2024-03-20 ASSESSMENT — COGNITIVE AND FUNCTIONAL STATUS - GENERAL
DAILY ACTIVITIY SCORE: 24
MOBILITY SCORE: 24
DAILY ACTIVITIY SCORE: 24
MOBILITY SCORE: 24

## 2024-03-20 ASSESSMENT — PAIN SCALES - GENERAL
PAINLEVEL_OUTOF10: 0 - NO PAIN

## 2024-03-20 ASSESSMENT — PAIN - FUNCTIONAL ASSESSMENT
PAIN_FUNCTIONAL_ASSESSMENT: 0-10
PAIN_FUNCTIONAL_ASSESSMENT: 0-10

## 2024-03-20 NOTE — CARE PLAN
The patient's goals for the shift include to get better    The clinical goals for the shift include safety      Problem: Pain  Goal: My pain/discomfort is manageable  Outcome: Progressing     Problem: Daily Care  Goal: Daily care needs are met  Outcome: Progressing     Problem: Skin  Goal: Promote/optimize nutrition  Outcome: Progressing     Problem: Skin  Goal: Participates in plan/prevention/treatment measures  Outcome: Progressing     Problem: Fall/Injury  Goal: Verbalize understanding of personal risk factors for fall in the hospital  Outcome: Progressing

## 2024-03-20 NOTE — PROGRESS NOTES
"PROGRESS NOTE    ASSESSMENT AND PLAN:   Acute bilateral pulmonary embolism  Intermittent lightheadedness  -P/W multiple episodes of lightheadedness and worsening shortness of breath  -CT angio of chest demonstrates small bilateral Pulmonary embolism; no evidence of RV strain  -Etiology still remains unclear, this is most likely unprovoked     Plan:  -Ultrasound lower extremity showed no acute findings  -Obtain echocardiogram  -Continue IV heparin        History of coronary artery disease status post stenting  -ECG shows normal sinus rhythm, troponins negative.  -Nuclear stress test done in 2023 shows no evidence of ischemia  -Continue Plavix     History of BPH  -Continue finasteride     History of abdominal aortic aneurysm  -Stable will need outpatient follow-up     History of COPD  Pulmonary nodule, stable  -Continue outpatient workup        Abdominal aortic aneurysm  -Follow-up outpatient     History of paroxysmal atrial fibrillation  -He had 1 episode of paroxysmal atrial fibrillation in 2022 and converted on his own since then has been in sinus rhythm.  -He was discharged on room Xarelto at some point and it was discontinued.           VTE Prophylaxis: Heparin        Jada Mtz MD      SUBJECTIVE:   Admit Date: 3/19/2024    Interval History: Still continues to have shortness of breath, admits to pleuritic chest pain      OBJECTIVE:   Vitals: /59 (BP Location: Left arm, Patient Position: Sitting)   Pulse 53   Temp 36.2 °C (97.2 °F) (Temporal)   Resp 16   Ht 1.778 m (5' 10\")   Wt 88.9 kg (195 lb 15.8 oz)   SpO2 96%   BMI 28.12 kg/m²    Wt Readings from Last 3 Encounters:   03/19/24 88.9 kg (195 lb 15.8 oz)   09/25/23 91.2 kg (201 lb)   08/14/23 93.2 kg (205 lb 7 oz)      24HR INTAKE/OUTPUT:    Intake/Output Summary (Last 24 hours) at 3/20/2024 1547  Last data filed at 3/20/2024 0900  Gross per 24 hour   Intake 455.8 ml   Output --   Net 455.8 ml       PHYSICAL EXAM:   GENERAL: Laying " "in bed, does not appear to be in any distress.   HEENT: HEAD: Normocephalic atraumatic.  Neck: Supple.  Eyes: Pupils are reactive to direct light.   CVS: S1, S2 heard. Regular rate and rhythm  LUNGS: Clear to auscultate bilaterally. No wheezing or rhonchi appreciated.  ABDOMEN: Soft, nontender to palpate. Positive bowel sounds. No guarding or rebound appreciated.  NEUROLOGICAL: No focal neurological deficits appreciated. Cranial nerves are grossly intact.  EXTREMITIES: Minimal edema appreciated.  SKIN:  Grossly intact, warm and dry.      LABS/IMAGING AND MEDICATIONS:   Scheduled Meds:acetaminophen, 650 mg, oral, Once  clopidogrel, 75 mg, oral, Daily  finasteride, 5 mg, oral, Daily  metoprolol succinate XL, 25 mg, oral, Daily  metoprolol succinate XL, 50 mg, oral, Daily  rosuvastatin, 20 mg, oral, Nightly  tamsulosin, 0.4 mg, oral, Nightly  tiotropium, 2 puff, inhalation, Daily      PRN Meds:PRN medications: heparin    No lab exists for component: \"CBC\"   No lab exists for component: \"CMP\"   No lab exists for component: \"TROPONIN\"  Results from last 7 days   Lab Units 03/19/24  1140   BNP pg/mL 171*     Results from last 7 days   Lab Units 03/19/24  1140   INR  1.2*     No lab exists for component: \"LIPIDS\"       No lab exists for component: \"URINALYSIS\"          BMP:  Results from last 7 days   Lab Units 03/20/24  0431 03/19/24  1140   SODIUM mmol/L 135* 135*   POTASSIUM mmol/L 4.3 4.3   CHLORIDE mmol/L 103 103   CO2 mmol/L 25 28   BUN mg/dL 18 11   CREATININE mg/dL 0.88 1.05       CBC:  Results from last 7 days   Lab Units 03/20/24  0431 03/19/24  1140   WBC AUTO x10*3/uL 4.6 4.7   RBC AUTO x10*6/uL 4.80 4.54   HEMOGLOBIN g/dL 14.6 14.0   HEMATOCRIT % 42.2 40.7*   MCV fL 88 90   MCH pg 30.4 30.8   MCHC g/dL 34.6 34.4   RDW % 13.3 13.3   PLATELETS AUTO x10*3/uL 158 147*       Cardiac Enzymes:   Results from last 7 days   Lab Units 03/20/24  0431 03/19/24  1233 03/19/24  1140   TROPHS ng/L 7 9 10         Hepatic " "Function Panel:  Results from last 7 days   Lab Units 03/19/24  1140   ALK PHOS U/L 78   ALT U/L 13   AST U/L 20   PROTEIN TOTAL g/dL 6.8   BILIRUBIN TOTAL mg/dL 0.6       Magnesium:  Results from last 7 days   Lab Units 03/20/24  0431   MAGNESIUM mg/dL 1.91       Pro-BNP:  No results found for: \"PROBNP\"    INR:  Results from last 7 days   Lab Units 03/19/24  1140   PROTIME seconds 13.8*   INR  1.2*       TSH:  No results found for: \"TSH\"    Lipid Profile:        No lab exists for component: \"LABVLDL\"    HgbA1C:        Lactate Level:  No lab exists for component: \"LACTA\"    CMP:  Results from last 7 days   Lab Units 03/20/24  0431 03/19/24  1140   SODIUM mmol/L 135* 135*   POTASSIUM mmol/L 4.3 4.3   CHLORIDE mmol/L 103 103   CO2 mmol/L 25 28   BUN mg/dL 18 11   CREATININE mg/dL 0.88 1.05   GLUCOSE mg/dL 135* 97   CALCIUM mg/dL 8.7 8.4*   PROTEIN TOTAL g/dL  --  6.8   BILIRUBIN TOTAL mg/dL  --  0.6   ALK PHOS U/L  --  78   AST U/L  --  20   ALT U/L  --  13       Amylase:        Lipase:        ABG:        No lab exists for component: \"PO2\", \"PCO2\", \"HCO3\", \"BE\", \"O2SAT\"       "

## 2024-03-20 NOTE — PROGRESS NOTES
03/20/24 0905   Discharge Planning   Living Arrangements Spouse/significant other   Support Systems Spouse/significant other   Assistance Needed none   Type of Residence Private residence   Number of Stairs to Enter Residence 1   Do you have animals or pets at home? No   Who is requesting discharge planning? Provider   Home or Post Acute Services None   Patient expects to be discharged to: Home   Does the patient need discharge transport arranged? No     Patient admitted with PE, was short of breath and feeling light headed, CT of chest revealed bilateral PE in lungs. Patient currently on Heparin gtt., will continue to follow, will consult RN Navigagor as patient will possible discharge on Eliquis or Xarelto, which apparently he was on but discontinued some time ago. Patient is on Plavix also.

## 2024-03-20 NOTE — CARE PLAN
The patient's goals for the shift include      The clinical goals for the shift include Medication within therapeutic range

## 2024-03-21 ENCOUNTER — APPOINTMENT (OUTPATIENT)
Dept: CARDIOLOGY | Facility: HOSPITAL | Age: 79
DRG: 176 | End: 2024-03-21
Payer: MEDICARE

## 2024-03-21 ENCOUNTER — PHARMACY VISIT (OUTPATIENT)
Dept: PHARMACY | Facility: CLINIC | Age: 79
End: 2024-03-21
Payer: COMMERCIAL

## 2024-03-21 VITALS
WEIGHT: 195.99 LBS | HEART RATE: 51 BPM | RESPIRATION RATE: 17 BRPM | OXYGEN SATURATION: 95 % | BODY MASS INDEX: 28.06 KG/M2 | HEIGHT: 70 IN | TEMPERATURE: 97.2 F | SYSTOLIC BLOOD PRESSURE: 131 MMHG | DIASTOLIC BLOOD PRESSURE: 70 MMHG

## 2024-03-21 LAB
ANION GAP SERPL CALC-SCNC: 8 MMOL/L (ref 10–20)
AORTIC VALVE MEAN GRADIENT: 5 MMHG
AORTIC VALVE PEAK VELOCITY: 1.54 M/S
AV PEAK GRADIENT: 9.5 MMHG
AVA (PEAK VEL): 2.22 CM2
AVA (VTI): 2.57 CM2
BUN SERPL-MCNC: 25 MG/DL (ref 6–23)
CALCIUM SERPL-MCNC: 8.4 MG/DL (ref 8.6–10.3)
CHLORIDE SERPL-SCNC: 104 MMOL/L (ref 98–107)
CO2 SERPL-SCNC: 31 MMOL/L (ref 21–32)
CREAT SERPL-MCNC: 1.01 MG/DL (ref 0.5–1.3)
EGFRCR SERPLBLD CKD-EPI 2021: 76 ML/MIN/1.73M*2
EJECTION FRACTION APICAL 4 CHAMBER: 67.7
EJECTION FRACTION: 68 %
ERYTHROCYTE [DISTWIDTH] IN BLOOD BY AUTOMATED COUNT: 13.8 % (ref 11.5–14.5)
GLUCOSE SERPL-MCNC: 83 MG/DL (ref 74–99)
HCT VFR BLD AUTO: 40.5 % (ref 41–52)
HGB BLD-MCNC: 13.9 G/DL (ref 13.5–17.5)
HOLD SPECIMEN: NORMAL
LEFT ATRIUM VOLUME AREA LENGTH INDEX BSA: 13.6 ML/M2
LEFT VENTRICLE INTERNAL DIMENSION DIASTOLE: 4.1 CM (ref 3.5–6)
LEFT VENTRICULAR OUTFLOW TRACT DIAMETER: 2 CM
MAGNESIUM SERPL-MCNC: 1.89 MG/DL (ref 1.6–2.4)
MCH RBC QN AUTO: 31.2 PG (ref 26–34)
MCHC RBC AUTO-ENTMCNC: 34.3 G/DL (ref 32–36)
MCV RBC AUTO: 91 FL (ref 80–100)
MITRAL VALVE E/A RATIO: 1.02
MITRAL VALVE E/E' RATIO: 8.7
NRBC BLD-RTO: 0 /100 WBCS (ref 0–0)
PLATELET # BLD AUTO: 165 X10*3/UL (ref 150–450)
POTASSIUM SERPL-SCNC: 4 MMOL/L (ref 3.5–5.3)
RBC # BLD AUTO: 4.45 X10*6/UL (ref 4.5–5.9)
RIGHT VENTRICLE FREE WALL PEAK S': 10.6 CM/S
RIGHT VENTRICLE PEAK SYSTOLIC PRESSURE: 28.2 MMHG
SODIUM SERPL-SCNC: 139 MMOL/L (ref 136–145)
TRICUSPID ANNULAR PLANE SYSTOLIC EXCURSION: 2.9 CM
UFH PPP CHRO-ACNC: 0.7 IU/ML
WBC # BLD AUTO: 7.8 X10*3/UL (ref 4.4–11.3)

## 2024-03-21 PROCEDURE — 2500000001 HC RX 250 WO HCPCS SELF ADMINISTERED DRUGS (ALT 637 FOR MEDICARE OP): Performed by: STUDENT IN AN ORGANIZED HEALTH CARE EDUCATION/TRAINING PROGRAM

## 2024-03-21 PROCEDURE — 85027 COMPLETE CBC AUTOMATED: CPT | Performed by: HOSPITALIST

## 2024-03-21 PROCEDURE — 93306 TTE W/DOPPLER COMPLETE: CPT

## 2024-03-21 PROCEDURE — RXMED WILLOW AMBULATORY MEDICATION CHARGE

## 2024-03-21 PROCEDURE — 36415 COLL VENOUS BLD VENIPUNCTURE: CPT | Performed by: HOSPITALIST

## 2024-03-21 PROCEDURE — 80048 BASIC METABOLIC PNL TOTAL CA: CPT | Performed by: HOSPITALIST

## 2024-03-21 PROCEDURE — 83735 ASSAY OF MAGNESIUM: CPT | Performed by: HOSPITALIST

## 2024-03-21 PROCEDURE — 85520 HEPARIN ASSAY: CPT | Performed by: HOSPITALIST

## 2024-03-21 PROCEDURE — 2500000001 HC RX 250 WO HCPCS SELF ADMINISTERED DRUGS (ALT 637 FOR MEDICARE OP): Performed by: HOSPITALIST

## 2024-03-21 PROCEDURE — 2500000004 HC RX 250 GENERAL PHARMACY W/ HCPCS (ALT 636 FOR OP/ED): Performed by: HOSPITALIST

## 2024-03-21 PROCEDURE — 99239 HOSP IP/OBS DSCHRG MGMT >30: CPT | Performed by: STUDENT IN AN ORGANIZED HEALTH CARE EDUCATION/TRAINING PROGRAM

## 2024-03-21 PROCEDURE — 93306 TTE W/DOPPLER COMPLETE: CPT | Performed by: INTERNAL MEDICINE

## 2024-03-21 RX ADMIN — METOPROLOL SUCCINATE 50 MG: 50 TABLET, EXTENDED RELEASE ORAL at 08:22

## 2024-03-21 RX ADMIN — APIXABAN 10 MG: 5 TABLET, FILM COATED ORAL at 12:42

## 2024-03-21 RX ADMIN — PERFLUTREN 1.5 ML OF DILUTION: 6.52 INJECTION, SUSPENSION INTRAVENOUS at 09:54

## 2024-03-21 RX ADMIN — METOPROLOL SUCCINATE 25 MG: 25 TABLET, EXTENDED RELEASE ORAL at 08:22

## 2024-03-21 RX ADMIN — TIOTROPIUM BROMIDE INHALATION SPRAY 2 PUFF: 3.12 SPRAY, METERED RESPIRATORY (INHALATION) at 09:00

## 2024-03-21 RX ADMIN — FINASTERIDE 5 MG: 5 TABLET, FILM COATED ORAL at 08:22

## 2024-03-21 RX ADMIN — CLOPIDOGREL 75 MG: 75 TABLET ORAL at 08:22

## 2024-03-21 ASSESSMENT — COGNITIVE AND FUNCTIONAL STATUS - GENERAL
MOBILITY SCORE: 24
DAILY ACTIVITIY SCORE: 24

## 2024-03-21 ASSESSMENT — PAIN SCALES - GENERAL: PAINLEVEL_OUTOF10: 0 - NO PAIN

## 2024-03-21 NOTE — CARE PLAN
The patient's goals for the shift include to get better    The clinical goals for the shift include safety    Problem: Fall/Injury  Goal: Not fall by end of shift  Outcome: Progressing     Problem: Fall/Injury  Goal: Be free from injury by end of the shift  Outcome: Progressing     Problem: Pain  Goal: My pain/discomfort is manageable  Outcome: Progressing     Problem: Skin  Goal: Promote/optimize nutrition  Outcome: Progressing     Problem: Fall/Injury  Goal: Verbalize understanding of risk factor reduction measures to prevent injury from fall in the home  Outcome: Progressing

## 2024-03-21 NOTE — PROGRESS NOTES
Eliquis Starter pack delivered to bedside. Patient still on heparin drip navigator will return after 1st dose given.

## 2024-03-21 NOTE — PROGRESS NOTES
03/21/24 1142   Current Planned Discharge Disposition   Current Planned Discharge Disposition Home     Patient discharging home will be on Eliquis, per navigator.

## 2024-03-21 NOTE — DISCHARGE SUMMARY
Medical Group Discharge Summary  DISCHARGE DIAGNOSIS     Acute bilateral PE    HOSPITAL COURSE AND DETAILS     This is a 79-year-old male with a significant past medical history of BPH, COPD, AAA, brief episode of atrial fibrillation not on anticoagulation that presented from home with chief complaints of worsening dizziness and shortness of breath along with chest pain with deep breaths.  Patient was found to have acute bilateral PE and was admitted for heparin infusion.  Patient with continued chest pain or shortness of breath therefore echocardiogram was completed and showed no significant abnormalities, EF of 60 to 65%, no RV strain however there was trace MV regurg and stenosis.  Main pulmonary artery seen and were normal in size and position within normal bifurcation.    Patient with the assistance of the RN navigator is being discharged home with Eliquis as a loading dose for the next 7 days followed by 5 mg twice daily.  Patient as well as his wife over the phone were advised to follow-up with her PCP to discuss hospitalization as well as their primary cardiologist.    Unclear if this is a provoked versus unprovoked blood clot.  Patient and wife deny having any history of cancer, personal blood clots, family history of blood clots.  Did have recent travel to [place] via a flight back in November 2023.  No recent hospitalizations, does not smoke cigarettes at this time, no recent surgeries or travel otherwise.  Advised to have outpatient follow-up with PCP to discuss further workup.    Total time spent on discharge: >30min      ---Of note, this documentation is completed using the Dragon Dictation system (voice recognition software). There may be spelling and/or grammatical errors that were not corrected prior to final submission.---    Ebenezer Cutler MD    DISCHARGE PHYSICAL EXAM     Last Recorded Vitals:  Vitals:    03/20/24 1343 03/20/24 2022 03/20/24 2324 03/21/24 0731   BP: 103/59 111/58 125/71  131/70   BP Location: Left arm Left arm Left arm Left arm   Patient Position: Sitting Sitting Lying Lying   Pulse: 53 58 51 51   Resp: 16 17 17 17   Temp: 36.2 °C (97.2 °F) 36.6 °C (97.9 °F) 36.9 °C (98.4 °F) 36.2 °C (97.2 °F)   TempSrc: Temporal Temporal Temporal Temporal   SpO2: 96% 96% 96% 95%   Weight:       Height:           Physical Exam  Vitals reviewed.   Constitutional:       General: He is not in acute distress.     Appearance: Normal appearance. He is normal weight. He is not ill-appearing.   HENT:      Head: Normocephalic and atraumatic.      Mouth/Throat:      Pharynx: No posterior oropharyngeal erythema.   Eyes:      Extraocular Movements: Extraocular movements intact.      Comments: Bilateral erythema   Cardiovascular:      Rate and Rhythm: Normal rate and regular rhythm.      Pulses: Normal pulses.      Heart sounds: Normal heart sounds.   Pulmonary:      Effort: Pulmonary effort is normal.      Breath sounds: Normal breath sounds.   Abdominal:      General: Bowel sounds are normal.      Palpations: Abdomen is soft.      Tenderness: There is no abdominal tenderness. There is no guarding.   Musculoskeletal:         General: No tenderness. Normal range of motion.      Cervical back: Normal range of motion and neck supple.   Neurological:      General: No focal deficit present.      Mental Status: He is alert and oriented to person, place, and time. Mental status is at baseline.   Psychiatric:         Mood and Affect: Mood normal.         Behavior: Behavior normal.       DISCHARGE MEDICATIONS        Your medication list        START taking these medications        Instructions Last Dose Given Next Dose Due   Eliquis DVT-PE Treat 30D Start 5 mg (74 tabs) tablet  Generic drug: apixaban      Take 2 tablets (10 mg) by mouth 2 times a day for 7 days, then take 1 tablet (5 mg) by mouth 2 times a day.              CONTINUE taking these medications        Instructions Last Dose Given Next Dose Due   albuterol  90 mcg/actuation aerosol powdr breath activated inhaler           clopidogrel 75 mg tablet  Commonly known as: Plavix           finasteride 5 mg tablet  Commonly known as: Proscar           lisinopril 20 mg tablet           metoprolol succinate XL 50 mg 24 hr tablet  Commonly known as: Toprol-XL           metoprolol succinate XL 25 mg 24 hr tablet  Commonly known as: Toprol-XL           nitroglycerin 0.4 mg SL tablet  Commonly known as: Nitrostat      Place 1 tablet (0.4 mg) under the tongue every 5 minutes if needed for chest pain.       rosuvastatin 20 mg tablet  Commonly known as: Crestor           Spiriva Respimat 2.5 mcg/actuation inhaler  Generic drug: tiotropium           tamsulosin 0.4 mg 24 hr capsule  Commonly known as: Flomax                     Where to Get Your Medications        These medications were sent to [facility]  [address] [place] [address]      Hours: 9 AM to 5:30 PM Mon-Fri, 9 AM to 1 PM Saturday Phone: [phone]   Eliquis DVT-PE Treat 30D Start 5 mg (74 tabs) tablet       OUTPATIENT FOLLOW-UP     Future Appointments   Date Time Provider Department Center   3/25/2024 12:00 PM Richi Garcia DO [id number] [place]

## 2024-03-22 ENCOUNTER — APPOINTMENT (OUTPATIENT)
Dept: CARDIAC REHAB | Facility: HOSPITAL | Age: 79
End: 2024-03-22
Payer: MEDICARE

## 2024-03-22 ENCOUNTER — PATIENT OUTREACH (OUTPATIENT)
Dept: CARDIOLOGY | Facility: CLINIC | Age: 79
End: 2024-03-22

## 2024-03-22 NOTE — PROGRESS NOTES
Discharge Facility:  [facility]  Discharge Diagnosis:  Acute Bilat PE  Admission Date:  3/19/24  Discharge Date: 3/21/24    PCP Appointment Date: no appt listed  Specialist Appointment Date:  Dr Garcia 3/25/24  Hospital Encounter and Summary: Linked      Patient has an appt within 2 business days    
n/a

## 2024-03-25 ENCOUNTER — OFFICE VISIT (OUTPATIENT)
Dept: CARDIOLOGY | Facility: CLINIC | Age: 79
End: 2024-03-25
Payer: MEDICARE

## 2024-03-25 VITALS
HEART RATE: 56 BPM | WEIGHT: 203 LBS | SYSTOLIC BLOOD PRESSURE: 120 MMHG | BODY MASS INDEX: 29.13 KG/M2 | DIASTOLIC BLOOD PRESSURE: 60 MMHG

## 2024-03-25 DIAGNOSIS — I10 ESSENTIAL HYPERTENSION: ICD-10-CM

## 2024-03-25 DIAGNOSIS — R00.2 PALPITATIONS: ICD-10-CM

## 2024-03-25 DIAGNOSIS — I48.0 PAROXYSMAL ATRIAL FIBRILLATION (MULTI): ICD-10-CM

## 2024-03-25 DIAGNOSIS — I26.99 PULMONARY EMBOLISM, UNSPECIFIED CHRONICITY, UNSPECIFIED PULMONARY EMBOLISM TYPE, UNSPECIFIED WHETHER ACUTE COR PULMONALE PRESENT (MULTI): ICD-10-CM

## 2024-03-25 DIAGNOSIS — I25.10 CORONARY ARTERY DISEASE INVOLVING NATIVE CORONARY ARTERY OF NATIVE HEART WITHOUT ANGINA PECTORIS: ICD-10-CM

## 2024-03-25 DIAGNOSIS — E78.5 DYSLIPIDEMIA: ICD-10-CM

## 2024-03-25 PROCEDURE — 1036F TOBACCO NON-USER: CPT | Performed by: INTERNAL MEDICINE

## 2024-03-25 PROCEDURE — 3078F DIAST BP <80 MM HG: CPT | Performed by: INTERNAL MEDICINE

## 2024-03-25 PROCEDURE — 1159F MED LIST DOCD IN RCRD: CPT | Performed by: INTERNAL MEDICINE

## 2024-03-25 PROCEDURE — 3074F SYST BP LT 130 MM HG: CPT | Performed by: INTERNAL MEDICINE

## 2024-03-25 PROCEDURE — 99215 OFFICE O/P EST HI 40 MIN: CPT | Performed by: INTERNAL MEDICINE

## 2024-03-25 PROCEDURE — 1111F DSCHRG MED/CURRENT MED MERGE: CPT | Performed by: INTERNAL MEDICINE

## 2024-03-25 ASSESSMENT — ENCOUNTER SYMPTOMS
LOSS OF SENSATION IN FEET: 0
DEPRESSION: 0
OCCASIONAL FEELINGS OF UNSTEADINESS: 0

## 2024-03-25 NOTE — PATIENT INSTRUCTIONS
Continue same medications/treatment.  Patient educated on proper medication use.  Patient educated on risk factor modification.  Please bring any lab results from other providers/physicians to your next appointment.    Please bring all medicines, vitamins, and herbal supplements with you when you come to the office.    Prescriptions will not be filled unless you are compliant with your follow up appointments or have a follow up appointment scheduled as per instruction of your physician. Refills should be requested at the time of your visit.    Follow up in 4 weeks  Holter Monitor for 2 weeks.     ILILLIAN RN, AM SCRIBING FOR AND IN THE PRESENCE OF DR. ADRIEN ROQUE, DO, FACC

## 2024-03-25 NOTE — PROGRESS NOTES
Patient:  Femi Wilcox  YOB: 1945  MRN: [id number]       Chief Complaint/Active Symptoms:       Femi Wilcox is a 79 y.o. male who returns today for cardiac follow-up.  Patient is here after recent hospitalization for which she was found to have bilateral small pulmonary emboli.  He also had some transient atrial fibrillation.  Patient had atrial fibrillation in the past.  He also has a history of COPD AAA chronic respiratory issues pretension and smoking abuse.  He claims no symptoms at this time.  He appears to be in regular rhythm.  He is now on Eliquis for management of the emboli.  This will also be helpful with his PAF.  We have talked to him about this in the past but he had the declined use.  Patient does have coronary disease and prior angioplasty and stenting.  He remains on Plavix.    Objective:     Vitals:    03/25/24 1219   BP: 120/60   Pulse: 56       Vitals:    03/25/24 1219   Weight: 92.1 kg (203 lb)       Allergies:     No Known Allergies       Medications:     Current Outpatient Medications   Medication Instructions    albuterol 90 mcg/actuation aerosol powdr breath activated inhaler 2 puffs, inhalation, Every 6 hours PRN    apixaban (Eliquis) 5 mg (74 tabs) tablet Take 2 tablets (10 mg) by mouth 2 times a day for 7 days, then take 1 tablet (5 mg) by mouth 2 times a day.    clopidogrel (PLAVIX) 75 mg, oral, Daily    finasteride (PROSCAR) 5 mg, oral, Daily    lisinopril 20 mg, oral, Daily    metoprolol succinate XL (TOPROL-XL) 25 mg, oral, Daily    metoprolol succinate XL (TOPROL-XL) 50 mg, oral, Daily, In addition to 25mg script for total of 75mg once daily     nitroglycerin (NITROSTAT) 0.4 mg, sublingual, Every 5 min PRN    rosuvastatin (CRESTOR) 20 mg, oral, Daily    tamsulosin (FLOMAX) 0.4 mg, oral, Nightly    tiotropium (Spiriva Respimat) 2.5 mcg/actuation inhaler 2 puffs, inhalation, Daily       Physical Examination:   Constitutional:       Appearance: Healthy appearance. Not in  distress.   Neck:      Vascular: No JVR. JVD normal.   Pulmonary:      Effort: Pulmonary effort is normal.      Breath sounds: Normal breath sounds. No wheezing. No rhonchi. No rales.   Chest:      Chest wall: Not tender to palpatation.   Cardiovascular:      PMI at left midclavicular line. Normal rate. Regular rhythm. Normal S1. Normal S2.       Murmurs: There is no murmur.      No gallop.  No click. No rub.   Pulses:     Intact distal pulses.   Edema:     Peripheral edema absent.   Abdominal:      General: Bowel sounds are normal.      Palpations: Abdomen is soft.      Tenderness: There is no abdominal tenderness.   Musculoskeletal: Normal range of motion.         General: No tenderness. Skin:     General: Skin is warm and dry.   Neurological:      General: No focal deficit present.      Mental Status: Alert and oriented to person, place and time.            Lab:     CBC:   Lab Results   Component Value Date    WBC 7.8 03/21/2024    RBC 4.45 (L) 03/21/2024    HGB 13.9 03/21/2024    HCT 40.5 (L) 03/21/2024     03/21/2024        CMP:    Lab Results   Component Value Date     03/21/2024    K 4.0 03/21/2024     03/21/2024    CO2 31 03/21/2024    BUN 25 (H) 03/21/2024    CREATININE 1.01 03/21/2024    GLUCOSE 83 03/21/2024    CALCIUM 8.4 (L) 03/21/2024       Magnesium:    Lab Results   Component Value Date    MG 1.89 03/21/2024       Lipid Profile:    Lab Results   Component Value Date    TRIG 61 02/19/2022    HDL 57.0 02/19/2022       TSH:    Lab Results   Component Value Date    TSH 4.88 (H) 02/19/2022       BNP:   Lab Results   Component Value Date     (H) 03/19/2024        PT/INR:    Lab Results   Component Value Date    PROTIME 13.8 (H) 03/19/2024    INR 1.2 (H) 03/19/2024       HgBA1c:    Lab Results   Component Value Date    HGBA1C 5.7 (A) 02/19/2022       BMP:  Lab Results   Component Value Date     03/21/2024     (L) 03/20/2024     (L) 03/19/2024    K 4.0 03/21/2024     K 4.3 03/20/2024    K 4.3 03/19/2024     03/21/2024     03/20/2024     03/19/2024    CO2 31 03/21/2024    CO2 25 03/20/2024    CO2 28 03/19/2024    BUN 25 (H) 03/21/2024    BUN 18 03/20/2024    BUN 11 03/19/2024    CREATININE 1.01 03/21/2024    CREATININE 0.88 03/20/2024    CREATININE 1.05 03/19/2024       CBC:  Lab Results   Component Value Date    WBC 7.8 03/21/2024    WBC 4.6 03/20/2024    WBC 4.7 03/19/2024    RBC 4.45 (L) 03/21/2024    RBC 4.80 03/20/2024    RBC 4.54 03/19/2024    HGB 13.9 03/21/2024    HGB 14.6 03/20/2024    HGB 14.0 03/19/2024    HCT 40.5 (L) 03/21/2024    HCT 42.2 03/20/2024    HCT 40.7 (L) 03/19/2024    MCV 91 03/21/2024    MCV 88 03/20/2024    MCV 90 03/19/2024    MCH 31.2 03/21/2024    MCH 30.4 03/20/2024    MCH 30.8 03/19/2024    MCHC 34.3 03/21/2024    MCHC 34.6 03/20/2024    MCHC 34.4 03/19/2024    RDW 13.8 03/21/2024    RDW 13.3 03/20/2024    RDW 13.3 03/19/2024     03/21/2024     03/20/2024     (L) 03/19/2024       Cardiac Enzymes:    Lab Results   Component Value Date    TROPHS 7 03/20/2024    TROPHS 9 03/19/2024    TROPHS 10 03/19/2024       Hepatic Function Panel:    Lab Results   Component Value Date    ALKPHOS 78 03/19/2024    ALT 13 03/19/2024    AST 20 03/19/2024    PROT 6.8 03/19/2024    BILITOT 0.6 03/19/2024         Diagnostic Studies:     Lower extremity venous duplex bilateral    Result Date: 3/20/2024  Interpreted By:  Barry Goyal, STUDY: [facility] US LOWER EXTREMITY VENOUS DUPLEX BILATERAL;  3/20/2024 9:48 am   INDICATION: Signs/Symptoms:pe; h/o of swelling.   COMPARISON: None.   ACCESSION NUMBER(S): [id number]   ORDERING CLINICIAN: MARIA L MEJIA   TECHNIQUE: Vascular ultrasound of the bilateral lower extremity deep venous systems was  performed from the inguinal ligament into the proximal calf bilaterally using gray scale imaging, color doppler and spectral doppler.  Vessels imaged include:  common femoral veins,  proximal deep femoral veins, proximal greater saphenous veins,  proximal, mid, and distal superficial femoral veins, popliteal veins, and the posterior tibial and peroneal veins in the calf.   FINDINGS: RIGHT LEG: Respiratory variation, flow, augmentation, and compressibility are preserved throughout.  There is no sonographic evidence of deep vein thrombosis in the right leg in this exam.   LEFT LEG: Respiratory variation, flow, augmentation, and compressibility are preserved throughout.  There is no sonographic evidence of deep vein thrombosis in the left leg in this exam.       Negative exam for lower extremity deep vein thrombosis.   MACRO: None   Signed by: Barry Goyal 3/20/2024 11:16 AM Dictation workstation:   [id number]    CT angio chest for pulmonary embolism    Result Date: 3/19/2024  Interpreted By:  Kita Craig, STUDY: CT ANGIO CHEST FOR PULMONARY EMBOLISM;  3/19/2024 1:53 pm   INDICATION: Signs/Symptoms:sob.   COMPARISON: 12/06/2023   ACCESSION NUMBER(S): [id number]   ORDERING CLINICIAN: MAGALI MORA   TECHNIQUE: CT of the chest was performed. Sagittal and coronal reconstructions were generated.  75 cc Omnipaque 350 intravenous contrast given for the examination.  Multiplanar reconstructions of the pulmonary vessels were created on an independent workstation and provided for review.   FINDINGS:     CHEST WALL AND LOWER NECK: Diminutive left thyroid lobe. No significant axillary lymphadenopathy.   MEDIASTINUM AND ROCIO:  Coarse subcentimeter presumed chris calcifications in the right hilum. No significant noncalcified mediastinal or hilar lymphadenopathy.   HEART AND VESSELS:  Small nearly occlusive filling defect in the posterior segmental branch of the left lower lobe pulmonary artery. Additional small nonocclusive filling defect/PE in the lateral segmental branch of the left lower lobe pulmonary artery and the right lower lobar and segmental pulmonary artery branches. No signs of right heart  strain. The heart is normal in size. No significant pericardial effusion. Multifocal atherosclerotic calcifications including the coronary arteries.   LUNGS, PLEURA, LARGE AIRWAYS:  Bullous/emphysematous changes and coarse patchy and linear presumed scarring in both lung apices. Spiculated 1.3 cm right upper lobe nodule image 98/314 is similar to the previous exam as well as 02/18/2022 and 07/18/2017. Multiple calcified granulomas scattered in both lung fields. Subtle heterogeneity/mosaic attenuation of both lung bases. No significant pleural effusion. The central airways are patent.   UPPER ABDOMEN:  Punctate calcified splenic granulomas. Rounded hypodensities arising from partially imaged bilateral kidneys. Dense probable excreted/previously administered contrast material in partially imaged nondilated proximal renal collecting systems.   BONES:  Diffuse osteopenia. Degenerative changes of the visualized spine.       Exam is positive for small bilateral lower pulmonary emboli. No signs of right heart strain.   Slight pulmonary basilar heterogeneity, possible mosaic attenuation related to small vessel or small airways disease.   1.3 cm spiculated right upper lobe nodule is stable dating back to 07/18/2017 consistent with benign etiology.   Sequela of remote granulomatous infection.   Additional findings as described above.   MACRO: None.   Signed by: Kita Craig 3/19/2024 2:37 PM Dictation workstation:   [id number]    CT head wo IV contrast    Result Date: 3/19/2024  Interpreted By:  Cooper Arnett, STUDY: CT HEAD WO IV CONTRAST; 3/19/2024 12:54 pm   INDICATION: Signs/Symptoms:lightheaded.   COMPARISON: CT head dated 12/02/2021   ACCESSION NUMBER(S): [id number]   ORDERING CLINICIAN: MAGALI MORA   TECHNIQUE: Contiguous axial CT images were obtained through the head at 5 mm slice thickness without contrast administration.   FINDINGS: INTRACRANIAL: The ventricles, sulci and basal cisterns are within normal  "limits for size and configuration. The grey-white differentiation is intact. There is no mass effect or midline shift. There is no extraaxial fluid collection. There is no intracranial hemorrhage.  The calvarium is unremarkable.   EXTRACRANIAL: Visualized paranasal sinuses and mastoids are clear.       No evidence of acute cortical infarct or intracranial hemorrhage.   MACRO: None     Signed by: Cooper Arnett 3/19/2024 1:16 PM Dictation workstation:   [id number]    ECG 12 lead    Result Date: 3/19/2024  Normal sinus rhythm Left axis deviation Abnormal ECG When compared with ECG of 19-FEB-2022 06:31, No significant change was found See ED provider note for full interpretation and clinical correlation Confirmed by Joann Lomas ([id number]) on 3/19/2024 12:21:17 PM    XR chest 1 view    Result Date: 3/19/2024  Interpreted By:  Chet Griggs, STUDY: XR CHEST 1 VIEW;  3/19/2024 11:32 am   INDICATION: Signs/Symptoms:Chest Pain.   COMPARISON: CT chest dated 12/06/2023 and chest radiograph dated 02/18/2022.   ACCESSION NUMBER(S): [id number]   ORDERING CLINICIAN: MAGALI MORA   FINDINGS: AP radiograph of the chest was provided.   DEVICES: None.   CARDIOMEDIASTINAL SILHOUETTE: Cardiomediastinal silhouette is normal in size and configuration. Aortic atherosclerotic calcification.   LUNGS: Similar appearance of a few nodular opacities within the right hemithorax, more accurately characterized on previous CT, with several representing calcified granulomas. There is also similar biapical pleuroparenchymal scarring. No definite superimposed acute airspace opacity. No pneumothorax. No pleural effusion.   ABDOMEN: No remarkable upper abdominal findings.   BONES: No acute osseous changes.       No evidence of superimposed acute cardiopulmonary process.   MACRO: None   Signed by: Chet Griggs 3/19/2024 11:39 AM Dictation workstation:   [id number]      EKG:   No results found for: \"EKG\"      Radiology:            [facility] " [place] [address]   Tel [phone] Fax [phone]     TRANSTHORACIC ECHOCARDIOGRAM REPORT        Patient Name:      GALILEO JANG          Reading Physician:    [id number] Richi Garcia DO  Study Date:        3/21/2024            Ordering Provider:    [id number] MARIA L GOMEZCARLOSSEVERO  MRN/PID:           [id number]             Fellow:  Accession#:        [id number]         Nurse:                Perry Bull RN  Date of Birth/Age: 1945 / 79 years Sonographer:          Funmilayo NGUYEN  Gender:            M                    Additional Staff:  Height:            177.80 cm            Admit Date:           3/19/2024  Weight:            88.45 kg             Admission Status:     Inpatient -                                                                Routine  BSA / BMI:         2.06 m2 / 27.98      Department Location:  [place] [address]                                      Echo Lab  Blood Pressure: 163 /77 mmHg     Study Type:    TRANSTHORACIC ECHO (TTE) COMPLETE  Diagnosis/ICD: Other pulmonary embolism without acute cor pulmonale-I26.99  Indication:    PULMONARY EMBOLISM  CPT Codes:     Echo Complete w Full Doppler-[id number]     Patient History:  Pertinent History: PE, CAD, HTN, A-Fib, Dyspnea, AAA and COPD.     Study Detail: The following Echo studies were performed: 2D, M-Mode, Doppler and                color flow. Technically challenging study due to poor acoustic                windows, prominent lung artifact and body habitus. Definity used                as a contrast agent for endocardial border definition. Total                contrast used for this procedure was 1.5 mL via IV push. The                patient was awake.        PHYSICIAN INTERPRETATION:  Left Ventricle:  Left ventricular systolic function is normal, with an estimated ejection fraction of 60-65%. There are no regional wall motion abnormalities. The left ventricular cavity size is normal. There is mild concentric left ventricular hypertrophy. Spectral Doppler shows a normal pattern of left ventricular diastolic filling.  LV Wall Scoring:  All segments are normal.     Left Atrium: The left atrium is normal in size.  Right Ventricle: The right ventricle is normal in size. There is normal right ventricular global systolic function.  Right Atrium: The right atrium is normal in size.  Aortic Valve: The aortic valve appears structurally normal. The aortic valve appears tricuspid. There is no evidence of aortic valve stenosis.  There is no evidence of aortic valve regurgitation. The peak instantaneous gradient of the aortic valve is 9.5 mmHg. The mean gradient of the aortic valve is 5.0 mmHg.  Mitral Valve: The mitral valve is normal in structure. There is evidence of mild mitral valve stenosis. There is normal mitral valve leaflet mobility. There is trace mitral valve regurgitation.  Tricuspid Valve: The tricuspid valve is structurally normal. There is normal tricuspid valve leaflet mobility. There is trace tricuspid regurgitation.  Pulmonic Valve: The pulmonic valve is structurally normal. There is no indication of pulmonic valve regurgitation.  Pericardium: There is no pericardial effusion noted.  Aorta: The aortic root is normal.  Pulmonary Artery: The main pulmonary artery is normal in size, and position, with normal bifurcation into the left and right pulmonary arteries.  Systemic Veins: The inferior vena cava appears to be of normal size.  In comparison to the previous echocardiogram(s): The left ventricular function is unchanged. The left ventricular diastolic function is normal.        CONCLUSIONS:   1. Left ventricular systolic function is normal with a 60-65% estimated ejection fraction.   2. There is evidence of  mild mitral valve stenosis.   3. Trace mitral valve regurgitation.   4. Trace tricuspid regurgitation is visualized.   5. Aortic valve stenosis is not present.   6. The main pulmonary artery is normal in size, and position, with normal bifurcation into the left and right pulmonary arteries.  No orders to display     Interpreted By:  JACEY CHAO MD  MRN: [id number]  Patient Name: GALILEO JANG     STUDY:  MYOCARDIAL PERFUSION STRESS TEST WITH LEXISCAN     Performing facility:  [facility],  [address] [place] [address] [id number],  [place], [place] [address] Provider:  Richi Garcia DO, FACC  PCP:  Dr. Lucy Hurley  Supervising provider:  Richi Garcia DO, FACC     INDICATION:  Chest Pain; Atypical  CAD S/P PTCA  SOBOE     HISTORY:  Gender:  M; Age:  79 y/o ; Height:  173.8 cm; Weight:  87 kg.     High Cholesterol;  HTN;  Chest Pain;  SOB;  CAD;  Arrhythmias;PAF  Family HX CAD;     Quit smoking 4 years ago.     Cardiac catheterization on 05/22.  PTCA on 05/22.     COMPARISON:  Previous nuclear testing completed on05/2022 at [facility].        ACCESSION NUMBER(S):  [id number]; [id number]; [id number]     ORDERING CLINICIAN:  RICHI GARCIA     TECHNIQUE:  ONE DAY protocol.  Stress injection: Date:09/13/23, 34.1 mCi of Myoview IV 20 seconds  after rapid injection of Lexiscan.  Rest injection: Date: 09/13/23, 10.9 mCi of Myoview IV at rest.  The patient had a rapid injection of  0.4 mg of Lexiscan IV over 10  seconds.  Imaging was performed by  gated tomographic technique.  Reason for Lexiscan: KNEE PAIN     STRESS TEST DATA:  Resting heart rate was 49 BPM.  Resting blood pressure was 142/76 mmHg.  Peak blood pressure was 130/76 mmHg.  Peak heart rate was 62 BPM.     TEST TERMINATED DUE TO:  Protocol completed     FINDINGS:  STRESS TEST RESULTS:     Resting electrocardiogram revealed sinus bradycardia.  There were no significant ischemic ECG changes or dysrhythmias.  The patient did not have chest  pains/symptoms during procedure.  There was a normal recovery phase.     IMAGING RESULTS:     Image quality was good.  Rest and stress tomographic images were reviewed and revealed normal  perfusion without evidence of ischemia, myocardial infarction, or  left ventricular dilatation with stress.  Overall left ventricular systolic function appeared to be normal  without regional wall motion abnormalities.  Ejection fraction was 76%.  TID is 0.88 and is normal.  There was evidence of diaphragmatic attenuation artifact.     IMPRESSION:  Normal Lexiscan Myoview cardiac perfusion stress test.  No evidence of ischemia or myocardial infarction by perfusion imaging.  Normal left ventricular systolic function, ejection fraction 76%.  When compared to prior study from May 2022, there has been no  significant changes.  None invasive risk stratification is low risk.  Assessment/Plan:         Patient Active Problem List   Diagnosis    BPH (benign prostatic hyperplasia)    CAD (coronary artery disease)    Dyslipidemia    Essential hypertension    COPD (chronic obstructive pulmonary disease) (CMS/[facility])    Palpitations    Paroxysmal atrial fibrillation (CMS/HCC)    Shortness of breath    Distal radius fracture    Lung nodule    Overweight    Pulmonary embolism, unspecified chronicity, unspecified pulmonary embolism type, unspecified whether acute cor pulmonale present (CMS/[facility])    BMI 29.0-29.9,adult         ASSESSMENT       79-year-old gentleman here for routine follow-up and posthospitalization pulmonary emboli.    Meds, vitals, examination as noted.    Chart was reviewed in detail discussed the patient and and his wife at length.    Impression:  ASHD class I-II  Remote PCI and stenting  Recent pulmonary emboli  's advanced COPD  Hypertension  PAF  Anticoagulation on board  Chronic dyspnea  Lung nodules  Minimal MR TR and MS on current echo  PLAN     Documentation:  Continue Eliquis and Plavix  See me back in approxi-1 month  Plan  outpatient 2-week Zio patch monitor to see if patient still has breakthrough A-fib  Call if any other problems  Follow-up with primary care and pulmonary as well  Repeat perfusion imaging scan in approximately 1 and half years unless condition changes occur             Respiratory

## 2024-03-27 ENCOUNTER — PATIENT OUTREACH (OUTPATIENT)
Dept: CARDIOLOGY | Facility: CLINIC | Age: 79
End: 2024-03-27
Payer: COMMERCIAL

## 2024-03-27 NOTE — PROGRESS NOTES
Unable to reach patient for call back after patient's follow up appointment with PCP.   PATM with call back number for patient to call if needed   If no voicemail available call attempts x 2 were made to contact the patient to assist with any questions or concerns patient may have.

## 2024-04-01 DIAGNOSIS — I26.99 PULMONARY EMBOLISM, UNSPECIFIED CHRONICITY, UNSPECIFIED PULMONARY EMBOLISM TYPE, UNSPECIFIED WHETHER ACUTE COR PULMONALE PRESENT (MULTI): ICD-10-CM

## 2024-04-05 ENCOUNTER — ANCILLARY PROCEDURE (OUTPATIENT)
Dept: CARDIOLOGY | Facility: CLINIC | Age: 79
End: 2024-04-05
Payer: MEDICARE

## 2024-04-05 DIAGNOSIS — I48.0 PAROXYSMAL ATRIAL FIBRILLATION (MULTI): ICD-10-CM

## 2024-04-05 DIAGNOSIS — R00.2 PALPITATIONS: ICD-10-CM

## 2024-04-05 PROCEDURE — 93246 EXT ECG>7D<15D RECORDING: CPT | Performed by: INTERNAL MEDICINE

## 2024-04-05 PROCEDURE — 93248 EXT ECG>7D<15D REV&INTERPJ: CPT | Performed by: INTERNAL MEDICINE

## 2024-07-01 DIAGNOSIS — I48.0 PAROXYSMAL ATRIAL FIBRILLATION (MULTI): ICD-10-CM

## 2024-07-01 DIAGNOSIS — I25.10 CORONARY ARTERY DISEASE INVOLVING NATIVE CORONARY ARTERY OF NATIVE HEART WITHOUT ANGINA PECTORIS: ICD-10-CM

## 2024-07-01 NOTE — TELEPHONE ENCOUNTER
Patient spouse called and LM stating the patient needs a refill however, patient was given starter pack of Eliquis and the patient wants the full prescription.     Received request for prescription refill for patient.  Patient follows with Dr. Richi Garcia DO, [facility]     Request is for Eliquis  Is patient currently on medication- yes    Last OV- 3/25/24  Next OV- 4/23/24 (patient no showed). Routed to [place] [address] [facility]ER[facility] for scheduling follow up.     Upon scheduling an appointment, please route refill to Funmilayo Bloom RN who will complete and send to provider.

## 2024-08-01 ENCOUNTER — APPOINTMENT (OUTPATIENT)
Dept: CARDIOLOGY | Facility: CLINIC | Age: 79
End: 2024-08-01
Payer: MEDICARE

## 2024-08-01 VITALS
BODY MASS INDEX: 29.49 KG/M2 | HEIGHT: 70 IN | SYSTOLIC BLOOD PRESSURE: 126 MMHG | DIASTOLIC BLOOD PRESSURE: 72 MMHG | WEIGHT: 206 LBS | HEART RATE: 56 BPM

## 2024-08-01 DIAGNOSIS — Z87.891 FORMER SMOKER: ICD-10-CM

## 2024-08-01 DIAGNOSIS — R00.2 PALPITATIONS: ICD-10-CM

## 2024-08-01 DIAGNOSIS — I25.10 CORONARY ARTERY DISEASE INVOLVING NATIVE CORONARY ARTERY OF NATIVE HEART WITHOUT ANGINA PECTORIS: ICD-10-CM

## 2024-08-01 DIAGNOSIS — I10 ESSENTIAL HYPERTENSION: ICD-10-CM

## 2024-08-01 DIAGNOSIS — E78.5 DYSLIPIDEMIA: ICD-10-CM

## 2024-08-01 DIAGNOSIS — I48.0 PAROXYSMAL ATRIAL FIBRILLATION (MULTI): ICD-10-CM

## 2024-08-01 PROCEDURE — 1159F MED LIST DOCD IN RCRD: CPT | Performed by: INTERNAL MEDICINE

## 2024-08-01 PROCEDURE — 99214 OFFICE O/P EST MOD 30 MIN: CPT | Performed by: INTERNAL MEDICINE

## 2024-08-01 PROCEDURE — 3074F SYST BP LT 130 MM HG: CPT | Performed by: INTERNAL MEDICINE

## 2024-08-01 PROCEDURE — 3078F DIAST BP <80 MM HG: CPT | Performed by: INTERNAL MEDICINE

## 2024-08-01 PROCEDURE — 1036F TOBACCO NON-USER: CPT | Performed by: INTERNAL MEDICINE

## 2024-08-01 RX ORDER — METOPROLOL SUCCINATE 50 MG/1
50 TABLET, EXTENDED RELEASE ORAL DAILY
Qty: 90 TABLET | Refills: 3 | Status: SHIPPED | OUTPATIENT
Start: 2024-08-01 | End: 2025-08-01

## 2024-08-01 RX ORDER — ROSUVASTATIN CALCIUM 20 MG/1
20 TABLET, COATED ORAL DAILY
Qty: 90 TABLET | Refills: 3 | Status: SHIPPED | OUTPATIENT
Start: 2024-08-01 | End: 2025-08-01

## 2024-08-01 RX ORDER — ALBUTEROL SULFATE 90 UG/1
2 AEROSOL, METERED RESPIRATORY (INHALATION) EVERY 6 HOURS PRN
COMMUNITY
Start: 2024-07-25

## 2024-08-01 RX ORDER — CLOPIDOGREL BISULFATE 75 MG/1
75 TABLET ORAL DAILY
Qty: 90 TABLET | Refills: 3 | Status: SHIPPED | OUTPATIENT
Start: 2024-08-01 | End: 2025-08-01

## 2024-08-01 NOTE — PATIENT INSTRUCTIONS

## 2024-08-01 NOTE — PROGRESS NOTES
Patient:  Femi Wilcox  YOB: 1945  MRN: [id number]       Chief Complaint/Active Symptoms:       Femi Wilcox is a 79 y.o. male who returns today for cardiac follow-up.  Patient is doing well.  Denies any chest pain or shortness of breath.  Recent monitor which showed no significant findings pertaining to major arrhythmias.  No A-fib.  He is already on Eliquis because of paroxysmal A-fib in the past and PE.  He denies chest pain or shortness of breath.  Coronary disease situation is stable.  We are planning to repeat his cardiac studies after his next visit in the new year.      Objective:     Vitals:    08/01/24 1248   BP: 126/72   Pulse: 56       Vitals:    08/01/24 1248   Weight: 93.4 kg (206 lb)       Allergies:     No Known Allergies       Medications:     Current Outpatient Medications   Medication Instructions    albuterol 90 mcg/actuation aerosol powdr breath activated inhaler 2 puffs, inhalation, Every 6 hours PRN    albuterol 90 mcg/actuation inhaler 2 puffs, inhalation, Every 6 hours PRN    apixaban (ELIQUIS) 5 mg, oral, 2 times daily, Take 2 tablets (10 mg) by mouth 2 times a day for 7 days, then take 1 tablet (5 mg) by mouth 2 times a day.    apixaban (ELIQUIS) 5 mg, oral, 2 times daily    clopidogrel (PLAVIX) 75 mg, oral, Daily    finasteride (PROSCAR) 5 mg, oral, Daily    lisinopril 20 mg, oral, Daily    metoprolol succinate XL (TOPROL-XL) 25 mg, oral, Daily    metoprolol succinate XL (TOPROL-XL) 50 mg, oral, Daily, In addition to 25mg script for total of 75mg once daily     nitroglycerin (NITROSTAT) 0.4 mg, sublingual, Every 5 min PRN    rosuvastatin (CRESTOR) 20 mg, oral, Daily    tamsulosin (FLOMAX) 0.4 mg, oral, Nightly    tiotropium (Spiriva Respimat) 2.5 mcg/actuation inhaler 2 puffs, inhalation, Daily       Physical Examination:   Constitutional:       Appearance: Healthy appearance. Not in distress.   Neck:      Vascular: No JVR. JVD normal.   Pulmonary:      Effort: Pulmonary  effort is normal.      Breath sounds: Normal breath sounds. No wheezing. No rhonchi. No rales.   Chest:      Chest wall: Not tender to palpatation.   Cardiovascular:      PMI at left midclavicular line. Normal rate. Regular rhythm. Normal S1. Normal S2.       Murmurs: There is no murmur.      No gallop.  No click. No rub.   Pulses:     Intact distal pulses.   Edema:     Peripheral edema absent.   Abdominal:      General: Bowel sounds are normal.      Palpations: Abdomen is soft.      Tenderness: There is no abdominal tenderness.   Musculoskeletal: Normal range of motion.         General: No tenderness. Skin:     General: Skin is warm and dry.   Neurological:      General: No focal deficit present.      Mental Status: Alert and oriented to person, place and time.            Lab:     CBC:   Lab Results   Component Value Date    WBC 7.8 03/21/2024    RBC 4.45 (L) 03/21/2024    HGB 13.9 03/21/2024    HCT 40.5 (L) 03/21/2024     03/21/2024        CMP:    Lab Results   Component Value Date     03/21/2024    K 4.0 03/21/2024     03/21/2024    CO2 31 03/21/2024    BUN 25 (H) 03/21/2024    CREATININE 1.01 03/21/2024    GLUCOSE 83 03/21/2024    CALCIUM 8.4 (L) 03/21/2024       Magnesium:    Lab Results   Component Value Date    MG 1.89 03/21/2024       Lipid Profile:    Lab Results   Component Value Date    TRIG 61 02/19/2022    HDL 57.0 02/19/2022       TSH:    Lab Results   Component Value Date    TSH 4.88 (H) 02/19/2022       BNP:   Lab Results   Component Value Date     (H) 03/19/2024        PT/INR:    Lab Results   Component Value Date    PROTIME 13.8 (H) 03/19/2024    INR 1.2 (H) 03/19/2024       HgBA1c:    Lab Results   Component Value Date    HGBA1C 5.7 (A) 02/19/2022       BMP:  Lab Results   Component Value Date     03/21/2024     (L) 03/20/2024     (L) 03/19/2024    K 4.0 03/21/2024    K 4.3 03/20/2024    K 4.3 03/19/2024     03/21/2024     03/20/2024    CL  103 03/19/2024    CO2 31 03/21/2024    CO2 25 03/20/2024    CO2 28 03/19/2024    BUN 25 (H) 03/21/2024    BUN 18 03/20/2024    BUN 11 03/19/2024    CREATININE 1.01 03/21/2024    CREATININE 0.88 03/20/2024    CREATININE 1.05 03/19/2024       CBC:  Lab Results   Component Value Date    WBC 7.8 03/21/2024    WBC 4.6 03/20/2024    WBC 4.7 03/19/2024    RBC 4.45 (L) 03/21/2024    RBC 4.80 03/20/2024    RBC 4.54 03/19/2024    HGB 13.9 03/21/2024    HGB 14.6 03/20/2024    HGB 14.0 03/19/2024    HCT 40.5 (L) 03/21/2024    HCT 42.2 03/20/2024    HCT 40.7 (L) 03/19/2024    MCV 91 03/21/2024    MCV 88 03/20/2024    MCV 90 03/19/2024    MCH 31.2 03/21/2024    MCH 30.4 03/20/2024    MCH 30.8 03/19/2024    MCHC 34.3 03/21/2024    MCHC 34.6 03/20/2024    MCHC 34.4 03/19/2024    RDW 13.8 03/21/2024    RDW 13.3 03/20/2024    RDW 13.3 03/19/2024     03/21/2024     03/20/2024     (L) 03/19/2024       Cardiac Enzymes:    Lab Results   Component Value Date    TROPHS 7 03/20/2024    TROPHS 9 03/19/2024    TROPHS 10 03/19/2024       Hepatic Function Panel:    Lab Results   Component Value Date    ALKPHOS 78 03/19/2024    ALT 13 03/19/2024    AST 20 03/19/2024    PROT 6.8 03/19/2024    BILITOT 0.6 03/19/2024         Diagnostic Studies:     Lower extremity venous duplex bilateral    Result Date: 3/20/2024  Interpreted By:  Barry Goyal, STUDY: [facility] US LOWER EXTREMITY VENOUS DUPLEX BILATERAL;  3/20/2024 9:48 am   INDICATION: Signs/Symptoms:pe; h/o of swelling.   COMPARISON: None.   ACCESSION NUMBER(S): [id number]   ORDERING CLINICIAN: MARIA L MEJIA   TECHNIQUE: Vascular ultrasound of the bilateral lower extremity deep venous systems was  performed from the inguinal ligament into the proximal calf bilaterally using gray scale imaging, color doppler and spectral doppler.  Vessels imaged include:  common femoral veins, proximal deep femoral veins, proximal greater saphenous veins,  proximal, mid, and distal  superficial femoral veins, popliteal veins, and the posterior tibial and peroneal veins in the calf.   FINDINGS: RIGHT LEG: Respiratory variation, flow, augmentation, and compressibility are preserved throughout.  There is no sonographic evidence of deep vein thrombosis in the right leg in this exam.   LEFT LEG: Respiratory variation, flow, augmentation, and compressibility are preserved throughout.  There is no sonographic evidence of deep vein thrombosis in the left leg in this exam.       Negative exam for lower extremity deep vein thrombosis.   MACRO: None   Signed by: Barry Goyal 3/20/2024 11:16 AM Dictation workstation:   [id number]    CT angio chest for pulmonary embolism    Result Date: 3/19/2024  Interpreted By:  Kita Craig, STUDY: CT ANGIO CHEST FOR PULMONARY EMBOLISM;  3/19/2024 1:53 pm   INDICATION: Signs/Symptoms:sob.   COMPARISON: 12/06/2023   ACCESSION NUMBER(S): [id number]   ORDERING CLINICIAN: MAGALI MORA   TECHNIQUE: CT of the chest was performed. Sagittal and coronal reconstructions were generated.  75 cc Omnipaque 350 intravenous contrast given for the examination.  Multiplanar reconstructions of the pulmonary vessels were created on an independent workstation and provided for review.   FINDINGS:     CHEST WALL AND LOWER NECK: Diminutive left thyroid lobe. No significant axillary lymphadenopathy.   MEDIASTINUM AND ROCIO:  Coarse subcentimeter presumed chris calcifications in the right hilum. No significant noncalcified mediastinal or hilar lymphadenopathy.   HEART AND VESSELS:  Small nearly occlusive filling defect in the posterior segmental branch of the left lower lobe pulmonary artery. Additional small nonocclusive filling defect/PE in the lateral segmental branch of the left lower lobe pulmonary artery and the right lower lobar and segmental pulmonary artery branches. No signs of right heart strain. The heart is normal in size. No significant pericardial effusion. Multifocal  atherosclerotic calcifications including the coronary arteries.   LUNGS, PLEURA, LARGE AIRWAYS:  Bullous/emphysematous changes and coarse patchy and linear presumed scarring in both lung apices. Spiculated 1.3 cm right upper lobe nodule image 98/314 is similar to the previous exam as well as 02/18/2022 and 07/18/2017. Multiple calcified granulomas scattered in both lung fields. Subtle heterogeneity/mosaic attenuation of both lung bases. No significant pleural effusion. The central airways are patent.   UPPER ABDOMEN:  Punctate calcified splenic granulomas. Rounded hypodensities arising from partially imaged bilateral kidneys. Dense probable excreted/previously administered contrast material in partially imaged nondilated proximal renal collecting systems.   BONES:  Diffuse osteopenia. Degenerative changes of the visualized spine.       Exam is positive for small bilateral lower pulmonary emboli. No signs of right heart strain.   Slight pulmonary basilar heterogeneity, possible mosaic attenuation related to small vessel or small airways disease.   1.3 cm spiculated right upper lobe nodule is stable dating back to 07/18/2017 consistent with benign etiology.   Sequela of remote granulomatous infection.   Additional findings as described above.   MACRO: None.   Signed by: Kita Craig 3/19/2024 2:37 PM Dictation workstation:   [id number]    CT head wo IV contrast    Result Date: 3/19/2024  Interpreted By:  Cooper Arnett, STUDY: CT HEAD WO IV CONTRAST; 3/19/2024 12:54 pm   INDICATION: Signs/Symptoms:lightheaded.   COMPARISON: CT head dated 12/02/2021   ACCESSION NUMBER(S): [id number]   ORDERING CLINICIAN: MAGALI MORA   TECHNIQUE: Contiguous axial CT images were obtained through the head at 5 mm slice thickness without contrast administration.   FINDINGS: INTRACRANIAL: The ventricles, sulci and basal cisterns are within normal limits for size and configuration. The grey-white differentiation is intact. There is  "no mass effect or midline shift. There is no extraaxial fluid collection. There is no intracranial hemorrhage.  The calvarium is unremarkable.   EXTRACRANIAL: Visualized paranasal sinuses and mastoids are clear.       No evidence of acute cortical infarct or intracranial hemorrhage.   MACRO: None     Signed by: Cooper Arnett 3/19/2024 1:16 PM Dictation workstation:   [id number]    ECG 12 lead    Result Date: 3/19/2024  Normal sinus rhythm Left axis deviation Abnormal ECG When compared with ECG of 19-FEB-2022 06:31, No significant change was found See ED provider note for full interpretation and clinical correlation Confirmed by Joann Lomas ([id number]) on 3/19/2024 12:21:17 PM    XR chest 1 view    Result Date: 3/19/2024  Interpreted By:  Chet Griggs, STUDY: XR CHEST 1 VIEW;  3/19/2024 11:32 am   INDICATION: Signs/Symptoms:Chest Pain.   COMPARISON: CT chest dated 12/06/2023 and chest radiograph dated 02/18/2022.   ACCESSION NUMBER(S): [id number]   ORDERING CLINICIAN: MAGALI MORA   FINDINGS: AP radiograph of the chest was provided.   DEVICES: None.   CARDIOMEDIASTINAL SILHOUETTE: Cardiomediastinal silhouette is normal in size and configuration. Aortic atherosclerotic calcification.   LUNGS: Similar appearance of a few nodular opacities within the right hemithorax, more accurately characterized on previous CT, with several representing calcified granulomas. There is also similar biapical pleuroparenchymal scarring. No definite superimposed acute airspace opacity. No pneumothorax. No pleural effusion.   ABDOMEN: No remarkable upper abdominal findings.   BONES: No acute osseous changes.       No evidence of superimposed acute cardiopulmonary process.   MACRO: None   Signed by: Chet Griggs 3/19/2024 11:39 AM Dictation workstation:   [id number]      EKG:   No results found for: \"EKG\"      Radiology:     No orders to display       Assessment/Plan:         Patient Active Problem List   Diagnosis    BPH (benign " prostatic hyperplasia)    CAD (coronary artery disease)    Dyslipidemia    Essential hypertension    COPD (chronic obstructive pulmonary disease) (Multi)    Palpitations    Paroxysmal atrial fibrillation (Multi)    Shortness of breath    Distal radius fracture    Lung nodule    Overweight    Pulmonary embolism, unspecified chronicity, unspecified pulmonary embolism type, unspecified whether acute cor pulmonale present (Multi)    BMI 29.0-29.9,adult         ASSESSMENT       79-year-old gentleman here for routine follow-up emboli.     Meds, vitals, examination as noted.     Chart was reviewed in detail discussed the patient and and his wife at length.     Impression:  ASHD class I-II  Remote PCI and stenting  Recent pulmonary emboli  's advanced COPD  Hypertension  PAF  Anticoagulation on board  Chronic dyspnea  Lung nodules  Minimal MR TR and MS on current echo  PLAN     Recommendation:  Continue current meds  See me in 6 months  Repeat cardiac studies thereafter  Usual safety measures on anticoagulation  Follow with primary care physicians as well

## 2024-10-02 DIAGNOSIS — I25.10 CORONARY ARTERY DISEASE INVOLVING NATIVE CORONARY ARTERY OF NATIVE HEART WITHOUT ANGINA PECTORIS: ICD-10-CM

## 2024-10-02 DIAGNOSIS — I48.0 PAROXYSMAL ATRIAL FIBRILLATION (MULTI): ICD-10-CM

## 2025-02-06 ENCOUNTER — APPOINTMENT (OUTPATIENT)
Dept: CARDIOLOGY | Facility: CLINIC | Age: 80
End: 2025-02-06
Payer: COMMERCIAL

## 2025-02-06 VITALS
BODY MASS INDEX: 29.96 KG/M2 | SYSTOLIC BLOOD PRESSURE: 120 MMHG | DIASTOLIC BLOOD PRESSURE: 70 MMHG | HEART RATE: 62 BPM | HEIGHT: 71 IN | WEIGHT: 214 LBS

## 2025-02-06 DIAGNOSIS — I48.0 PAROXYSMAL ATRIAL FIBRILLATION (MULTI): ICD-10-CM

## 2025-02-06 DIAGNOSIS — I25.10 CORONARY ARTERY DISEASE INVOLVING NATIVE CORONARY ARTERY OF NATIVE HEART WITHOUT ANGINA PECTORIS: Primary | ICD-10-CM

## 2025-02-06 DIAGNOSIS — R00.2 PALPITATIONS: ICD-10-CM

## 2025-02-06 DIAGNOSIS — E78.5 DYSLIPIDEMIA: ICD-10-CM

## 2025-02-06 DIAGNOSIS — Z87.891 FORMER SMOKER: ICD-10-CM

## 2025-02-06 DIAGNOSIS — I10 ESSENTIAL HYPERTENSION: ICD-10-CM

## 2025-02-06 PROCEDURE — 3074F SYST BP LT 130 MM HG: CPT | Performed by: INTERNAL MEDICINE

## 2025-02-06 PROCEDURE — 99214 OFFICE O/P EST MOD 30 MIN: CPT | Performed by: INTERNAL MEDICINE

## 2025-02-06 PROCEDURE — 3078F DIAST BP <80 MM HG: CPT | Performed by: INTERNAL MEDICINE

## 2025-02-06 PROCEDURE — 1159F MED LIST DOCD IN RCRD: CPT | Performed by: INTERNAL MEDICINE

## 2025-02-06 PROCEDURE — 1036F TOBACCO NON-USER: CPT | Performed by: INTERNAL MEDICINE

## 2025-02-06 NOTE — PROGRESS NOTES
Patient:  Femi Wilcox  YOB: 1945  MRN: [id number]       Chief Complaint/Active Symptoms:       Femi Wilcox is a 79 y.o. male who returns today for cardiac follow-up.  Patient is feeling well.  He denies chest pain shortness of breath or other clinical complaints.  He is ambulatory.  He is on appropriate medications without any great difficulties or problems.  He is due for his repeat cardiac studies as we discussed on his last visit this spring.  We can arrange for stress perfusion and echo and then call results.  If all is well he will see me back in 6 months.  His exam is unchanged from before and his vital signs are normal.      Objective:     Vitals:    02/06/25 1155   BP: 120/70   Pulse: 62       Vitals:    02/06/25 1155   Weight: 97.1 kg (214 lb)       Allergies:     No Known Allergies       Medications:     Current Outpatient Medications   Medication Instructions    albuterol 90 mcg/actuation aerosol powdr breath activated inhaler 2 puffs, Every 6 hours PRN    albuterol 90 mcg/actuation inhaler 2 puffs, Every 6 hours PRN    apixaban (ELIQUIS) 5 mg, oral, 2 times daily    clopidogrel (PLAVIX) 75 mg, oral, Daily    finasteride (PROSCAR) 5 mg, Daily    lisinopril 20 mg, Daily    metoprolol succinate XL (TOPROL-XL) 25 mg, Daily    metoprolol succinate XL (TOPROL-XL) 50 mg, oral, Daily, In addition to 25mg script for total of 75mg once daily    nitroglycerin (NITROSTAT) 0.4 mg, sublingual, Every 5 min PRN    rosuvastatin (CRESTOR) 20 mg, oral, Daily    tamsulosin (FLOMAX) 0.4 mg, Nightly    tiotropium (Spiriva Respimat) 2.5 mcg/actuation inhaler 2 puffs, Daily       Physical Examination:   Constitutional:       Appearance: Healthy appearance. Not in distress.   Neck:      Vascular: No JVR. JVD normal.   Pulmonary:      Effort: Pulmonary effort is normal.      Breath sounds: Normal breath sounds. No wheezing. No rhonchi. No rales.   Chest:      Chest wall: Not tender to palpatation.   Cardiovascular:       PMI at left midclavicular line. Normal rate. Regular rhythm. Normal S1. Normal S2.       Murmurs: There is no murmur.      No gallop.  No click. No rub.   Pulses:     Intact distal pulses.   Edema:     Peripheral edema absent.   Abdominal:      General: Bowel sounds are normal.      Palpations: Abdomen is soft.      Tenderness: There is no abdominal tenderness.   Musculoskeletal: Normal range of motion.         General: No tenderness. Skin:     General: Skin is warm and dry.   Neurological:      General: No focal deficit present.      Mental Status: Alert and oriented to person, place and time.            Lab:     CBC:   Lab Results   Component Value Date    WBC 7.8 03/21/2024    RBC 4.45 (L) 03/21/2024    HGB 13.9 03/21/2024    HCT 40.5 (L) 03/21/2024     03/21/2024        CMP:    Lab Results   Component Value Date     03/21/2024    K 4.0 03/21/2024     03/21/2024    CO2 31 03/21/2024    BUN 25 (H) 03/21/2024    CREATININE 1.01 03/21/2024    GLUCOSE 83 03/21/2024    CALCIUM 8.4 (L) 03/21/2024       Magnesium:    Lab Results   Component Value Date    MG 1.89 03/21/2024       Lipid Profile:    Lab Results   Component Value Date    TRIG 61 02/19/2022    HDL 57.0 02/19/2022       TSH:    Lab Results   Component Value Date    TSH 4.88 (H) 02/19/2022       BNP:   Lab Results   Component Value Date     (H) 03/19/2024        PT/INR:    Lab Results   Component Value Date    PROTIME 13.8 (H) 03/19/2024    INR 1.2 (H) 03/19/2024       HgBA1c:    Lab Results   Component Value Date    HGBA1C 5.5 12/20/2024       BMP:  Lab Results   Component Value Date     03/21/2024     (L) 03/20/2024     (L) 03/19/2024    K 4.0 03/21/2024    K 4.3 03/20/2024    K 4.3 03/19/2024     03/21/2024     03/20/2024     03/19/2024    CO2 31 03/21/2024    CO2 25 03/20/2024    CO2 28 03/19/2024    BUN 25 (H) 03/21/2024    BUN 18 03/20/2024    BUN 11 03/19/2024    CREATININE 1.01  03/21/2024    CREATININE 0.88 03/20/2024    CREATININE 1.05 03/19/2024       CBC:  Lab Results   Component Value Date    WBC 7.8 03/21/2024    WBC 4.6 03/20/2024    WBC 4.7 03/19/2024    RBC 4.45 (L) 03/21/2024    RBC 4.80 03/20/2024    RBC 4.54 03/19/2024    HGB 13.9 03/21/2024    HGB 14.6 03/20/2024    HGB 14.0 03/19/2024    HCT 40.5 (L) 03/21/2024    HCT 42.2 03/20/2024    HCT 40.7 (L) 03/19/2024    MCV 91 03/21/2024    MCV 88 03/20/2024    MCV 90 03/19/2024    MCH 31.2 03/21/2024    MCH 30.4 03/20/2024    MCH 30.8 03/19/2024    MCHC 34.3 03/21/2024    MCHC 34.6 03/20/2024    MCHC 34.4 03/19/2024    RDW 13.8 03/21/2024    RDW 13.3 03/20/2024    RDW 13.3 03/19/2024     03/21/2024     03/20/2024     (L) 03/19/2024       Cardiac Enzymes:    Lab Results   Component Value Date    TROPHS 7 03/20/2024    TROPHS 9 03/19/2024    TROPHS 10 03/19/2024       Hepatic Function Panel:    Lab Results   Component Value Date    ALKPHOS 78 03/19/2024    ALT 13 03/19/2024    AST 20 03/19/2024    PROT 6.8 03/19/2024    BILITOT 0.6 03/19/2024         Diagnostic Studies:     Lower extremity venous duplex bilateral    Result Date: 3/20/2024  Interpreted By:  Barry Goyal, STUDY: [facility] US LOWER EXTREMITY VENOUS DUPLEX BILATERAL;  3/20/2024 9:48 am   INDICATION: Signs/Symptoms:pe; h/o of swelling.   COMPARISON: None.   ACCESSION NUMBER(S): [id number]   ORDERING CLINICIAN: MARIA L MEJIA   TECHNIQUE: Vascular ultrasound of the bilateral lower extremity deep venous systems was  performed from the inguinal ligament into the proximal calf bilaterally using gray scale imaging, color doppler and spectral doppler.  Vessels imaged include:  common femoral veins, proximal deep femoral veins, proximal greater saphenous veins,  proximal, mid, and distal superficial femoral veins, popliteal veins, and the posterior tibial and peroneal veins in the calf.   FINDINGS: RIGHT LEG: Respiratory variation, flow,  augmentation, and compressibility are preserved throughout.  There is no sonographic evidence of deep vein thrombosis in the right leg in this exam.   LEFT LEG: Respiratory variation, flow, augmentation, and compressibility are preserved throughout.  There is no sonographic evidence of deep vein thrombosis in the left leg in this exam.       Negative exam for lower extremity deep vein thrombosis.   MACRO: None   Signed by: Barry Goyal 3/20/2024 11:16 AM Dictation workstation:   [id number]    CT angio chest for pulmonary embolism    Result Date: 3/19/2024  Interpreted By:  Kita Craig, STUDY: CT ANGIO CHEST FOR PULMONARY EMBOLISM;  3/19/2024 1:53 pm   INDICATION: Signs/Symptoms:sob.   COMPARISON: 12/06/2023   ACCESSION NUMBER(S): [id number]   ORDERING CLINICIAN: MAGALI MORA   TECHNIQUE: CT of the chest was performed. Sagittal and coronal reconstructions were generated.  75 cc Omnipaque 350 intravenous contrast given for the examination.  Multiplanar reconstructions of the pulmonary vessels were created on an independent workstation and provided for review.   FINDINGS:     CHEST WALL AND LOWER NECK: Diminutive left thyroid lobe. No significant axillary lymphadenopathy.   MEDIASTINUM AND ROCIO:  Coarse subcentimeter presumed chris calcifications in the right hilum. No significant noncalcified mediastinal or hilar lymphadenopathy.   HEART AND VESSELS:  Small nearly occlusive filling defect in the posterior segmental branch of the left lower lobe pulmonary artery. Additional small nonocclusive filling defect/PE in the lateral segmental branch of the left lower lobe pulmonary artery and the right lower lobar and segmental pulmonary artery branches. No signs of right heart strain. The heart is normal in size. No significant pericardial effusion. Multifocal atherosclerotic calcifications including the coronary arteries.   LUNGS, PLEURA, LARGE AIRWAYS:  Bullous/emphysematous changes and coarse patchy and linear  presumed scarring in both lung apices. Spiculated 1.3 cm right upper lobe nodule image 98/314 is similar to the previous exam as well as 02/18/2022 and 07/18/2017. Multiple calcified granulomas scattered in both lung fields. Subtle heterogeneity/mosaic attenuation of both lung bases. No significant pleural effusion. The central airways are patent.   UPPER ABDOMEN:  Punctate calcified splenic granulomas. Rounded hypodensities arising from partially imaged bilateral kidneys. Dense probable excreted/previously administered contrast material in partially imaged nondilated proximal renal collecting systems.   BONES:  Diffuse osteopenia. Degenerative changes of the visualized spine.       Exam is positive for small bilateral lower pulmonary emboli. No signs of right heart strain.   Slight pulmonary basilar heterogeneity, possible mosaic attenuation related to small vessel or small airways disease.   1.3 cm spiculated right upper lobe nodule is stable dating back to 07/18/2017 consistent with benign etiology.   Sequela of remote granulomatous infection.   Additional findings as described above.   MACRO: None.   Signed by: Kita Craig 3/19/2024 2:37 PM Dictation workstation:   [id number]    CT head wo IV contrast    Result Date: 3/19/2024  Interpreted By:  Cooper Arnett, STUDY: CT HEAD WO IV CONTRAST; 3/19/2024 12:54 pm   INDICATION: Signs/Symptoms:lightheaded.   COMPARISON: CT head dated 12/02/2021   ACCESSION NUMBER(S): [id number]   ORDERING CLINICIAN: MAGALI MORA   TECHNIQUE: Contiguous axial CT images were obtained through the head at 5 mm slice thickness without contrast administration.   FINDINGS: INTRACRANIAL: The ventricles, sulci and basal cisterns are within normal limits for size and configuration. The grey-white differentiation is intact. There is no mass effect or midline shift. There is no extraaxial fluid collection. There is no intracranial hemorrhage.  The calvarium is unremarkable.    "EXTRACRANIAL: Visualized paranasal sinuses and mastoids are clear.       No evidence of acute cortical infarct or intracranial hemorrhage.   MACRO: None     Signed by: Cooper Arnett 3/19/2024 1:16 PM Dictation workstation:   [id number]    ECG 12 lead    Result Date: 3/19/2024  Normal sinus rhythm Left axis deviation Abnormal ECG When compared with ECG of 19-FEB-2022 06:31, No significant change was found See ED provider note for full interpretation and clinical correlation Confirmed by Joann Lomas ([id number]) on 3/19/2024 12:21:17 PM    XR chest 1 view    Result Date: 3/19/2024  Interpreted By:  Chet Griggs, STUDY: XR CHEST 1 VIEW;  3/19/2024 11:32 am   INDICATION: Signs/Symptoms:Chest Pain.   COMPARISON: CT chest dated 12/06/2023 and chest radiograph dated 02/18/2022.   ACCESSION NUMBER(S): [id number]   ORDERING CLINICIAN: MAGALI MORA   FINDINGS: AP radiograph of the chest was provided.   DEVICES: None.   CARDIOMEDIASTINAL SILHOUETTE: Cardiomediastinal silhouette is normal in size and configuration. Aortic atherosclerotic calcification.   LUNGS: Similar appearance of a few nodular opacities within the right hemithorax, more accurately characterized on previous CT, with several representing calcified granulomas. There is also similar biapical pleuroparenchymal scarring. No definite superimposed acute airspace opacity. No pneumothorax. No pleural effusion.   ABDOMEN: No remarkable upper abdominal findings.   BONES: No acute osseous changes.       No evidence of superimposed acute cardiopulmonary process.   MACRO: None   Signed by: Chet Griggs 3/19/2024 11:39 AM Dictation workstation:   [id number]      EKG:   No results found for: \"EKG\"      Radiology:     Transthoracic Echo Complete    (Results Pending)   Nuclear Stress Test    (Results Pending)       Assessment/Plan:         Patient Active Problem List   Diagnosis    BPH (benign prostatic hyperplasia)    CAD (coronary artery disease)    Dyslipidemia    " Essential hypertension    COPD (chronic obstructive pulmonary disease) (Multi)    Palpitations    Paroxysmal atrial fibrillation (Multi)    Shortness of breath    Distal radius fracture    Lung nodule    Overweight    Pulmonary embolism, unspecified chronicity, unspecified pulmonary embolism type, unspecified whether acute cor pulmonale present (Multi)    BMI 29.0-29.9,adult    Former smoker         ASSESSMENT   79-year-old gentleman here for routine cardiovascular follow-up    Meds, vitals, examination as noted.    Chart review details cussed the patient at length.    Impression:    Diagnoses and all orders for this visit:  Coronary artery disease involving native coronary artery of native heart without angina pectoris  Dyslipidemia  Essential hypertension  Palpitations  Paroxysmal atrial fibrillation (Multi)  BMI 29.0-29.9,adult  Former smoker      PLAN   Recommendation:  Maintain current meds  See me in 6 months  Plan cardiac studies the spring and we will call results  Any condition change notify our office

## 2025-02-06 NOTE — PATIENT INSTRUCTIONS
Continue same medications/treatment.  Patient educated on proper medication use.  Patient educated on risk factor modification.  Please bring any lab results from other providers/physicians to your next appointment.    Please bring all medicines, vitamins, and herbal supplements with you when you come to the office.    Prescriptions will not be filled unless you are compliant with your follow up appointments or have a follow up appointment scheduled as per instruction of your physician. Refills should be requested at the time of your visit.    SCHEDULE ECHO and lexiscan stress test at your convenience.  Follow up with Dr. Garcia at the end of summer.    I, KEVIN DE LA TORRE, RN, AM SCRIBING FOR AND IN THE PRESENCE OF DR. ADRIEN GARCIA, DO, FACC

## 2025-03-11 ENCOUNTER — HOSPITAL ENCOUNTER (OUTPATIENT)
Dept: RADIOLOGY | Facility: CLINIC | Age: 80
Discharge: HOME | End: 2025-03-11
Payer: COMMERCIAL

## 2025-03-11 ENCOUNTER — HOSPITAL ENCOUNTER (OUTPATIENT)
Dept: CARDIOLOGY | Facility: CLINIC | Age: 80
Discharge: HOME | End: 2025-03-11
Payer: COMMERCIAL

## 2025-03-11 DIAGNOSIS — I25.10 CORONARY ARTERY DISEASE INVOLVING NATIVE CORONARY ARTERY OF NATIVE HEART WITHOUT ANGINA PECTORIS: ICD-10-CM

## 2025-03-11 DIAGNOSIS — I25.10 CORONARY ARTERY DISEASE INVOLVING NATIVE CORONARY ARTERY OF NATIVE HEART WITHOUT ANGINA PECTORIS: Primary | ICD-10-CM

## 2025-03-11 LAB
AORTIC VALVE MEAN GRADIENT: 4 MMHG
AORTIC VALVE PEAK VELOCITY: 1.45 M/S
AV PEAK GRADIENT: 8 MMHG
AVA (PEAK VEL): 3.6 CM2
AVA (VTI): 3.43 CM2
EJECTION FRACTION APICAL 4 CHAMBER: 60.9
EJECTION FRACTION: 60 %
LEFT ATRIUM VOLUME AREA LENGTH INDEX BSA: 17.7 ML/M2
LEFT VENTRICLE INTERNAL DIMENSION DIASTOLE: 3.87 CM (ref 3.5–6)
LEFT VENTRICULAR OUTFLOW TRACT DIAMETER: 2.31 CM
LV EJECTION FRACTION BIPLANE: 59 %
MITRAL VALVE E/A RATIO: 0.88

## 2025-03-11 PROCEDURE — 93016 CV STRESS TEST SUPVJ ONLY: CPT | Performed by: INTERNAL MEDICINE

## 2025-03-11 PROCEDURE — 93017 CV STRESS TEST TRACING ONLY: CPT

## 2025-03-11 PROCEDURE — 93306 TTE W/DOPPLER COMPLETE: CPT | Performed by: INTERNAL MEDICINE

## 2025-03-11 PROCEDURE — A9502 TC99M TETROFOSMIN: HCPCS | Performed by: INTERNAL MEDICINE

## 2025-03-11 PROCEDURE — 3430000001 HC RX 343 DIAGNOSTIC RADIOPHARMACEUTICALS: Performed by: INTERNAL MEDICINE

## 2025-03-11 PROCEDURE — 78452 HT MUSCLE IMAGE SPECT MULT: CPT

## 2025-03-11 PROCEDURE — 93306 TTE W/DOPPLER COMPLETE: CPT

## 2025-03-11 PROCEDURE — 2500000004 HC RX 250 GENERAL PHARMACY W/ HCPCS (ALT 636 FOR OP/ED): Performed by: INTERNAL MEDICINE

## 2025-03-11 PROCEDURE — 78452 HT MUSCLE IMAGE SPECT MULT: CPT | Performed by: INTERNAL MEDICINE

## 2025-03-11 PROCEDURE — 93018 CV STRESS TEST I&R ONLY: CPT | Performed by: INTERNAL MEDICINE

## 2025-03-11 RX ORDER — REGADENOSON 0.08 MG/ML
0.4 INJECTION, SOLUTION INTRAVENOUS ONCE
Status: COMPLETED | OUTPATIENT
Start: 2025-03-11 | End: 2025-03-11

## 2025-03-11 RX ADMIN — TETROFOSMIN 35.9 MILLICURIE: 0.23 INJECTION, POWDER, LYOPHILIZED, FOR SOLUTION INTRAVENOUS at 12:42

## 2025-03-11 RX ADMIN — REGADENOSON 0.4 MG: 0.08 INJECTION, SOLUTION INTRAVENOUS at 12:41

## 2025-03-11 RX ADMIN — TETROFOSMIN 10.5 MILLICURIE: 0.23 INJECTION, POWDER, LYOPHILIZED, FOR SOLUTION INTRAVENOUS at 11:36

## 2025-03-24 DIAGNOSIS — R07.9 CHEST PAIN, UNSPECIFIED TYPE: ICD-10-CM

## 2025-03-24 RX ORDER — NITROGLYCERIN 0.4 MG/1
0.4 TABLET SUBLINGUAL EVERY 5 MIN PRN
Qty: 25 TABLET | Refills: 5 | Status: SHIPPED | OUTPATIENT
Start: 2025-03-24

## 2025-03-24 NOTE — TELEPHONE ENCOUNTER
Received request for prescription refills for patient.   Patient follows with Dr. Garcia     Request is for Nitroglycerin  Is patient currently on medication yes    Last OV 2/6/25  Next OV 7/31/25  Pended for signing and sent to provider    
Deferred

## 2025-07-31 ENCOUNTER — APPOINTMENT (OUTPATIENT)
Dept: CARDIOLOGY | Facility: CLINIC | Age: 80
End: 2025-07-31
Payer: COMMERCIAL

## 2025-08-25 ENCOUNTER — APPOINTMENT (OUTPATIENT)
Dept: CARDIOLOGY | Facility: CLINIC | Age: 80
End: 2025-08-25
Payer: COMMERCIAL

## 2025-08-25 VITALS
DIASTOLIC BLOOD PRESSURE: 74 MMHG | BODY MASS INDEX: 29.47 KG/M2 | SYSTOLIC BLOOD PRESSURE: 120 MMHG | WEIGHT: 210.5 LBS | HEART RATE: 58 BPM | HEIGHT: 71 IN

## 2025-08-25 DIAGNOSIS — J44.9 CHRONIC OBSTRUCTIVE PULMONARY DISEASE, UNSPECIFIED COPD TYPE (MULTI): ICD-10-CM

## 2025-08-25 DIAGNOSIS — I25.10 CORONARY ARTERY DISEASE INVOLVING NATIVE CORONARY ARTERY OF NATIVE HEART WITHOUT ANGINA PECTORIS: ICD-10-CM

## 2025-08-25 DIAGNOSIS — R00.2 PALPITATIONS: ICD-10-CM

## 2025-08-25 DIAGNOSIS — E78.5 DYSLIPIDEMIA: ICD-10-CM

## 2025-08-25 DIAGNOSIS — I48.0 PAROXYSMAL ATRIAL FIBRILLATION (MULTI): ICD-10-CM

## 2025-08-25 DIAGNOSIS — Z87.891 FORMER SMOKER: ICD-10-CM

## 2025-08-25 DIAGNOSIS — I10 ESSENTIAL HYPERTENSION: ICD-10-CM

## 2025-08-25 PROCEDURE — 99214 OFFICE O/P EST MOD 30 MIN: CPT | Performed by: INTERNAL MEDICINE

## 2025-08-25 PROCEDURE — 1159F MED LIST DOCD IN RCRD: CPT | Performed by: INTERNAL MEDICINE

## 2025-08-25 PROCEDURE — 3078F DIAST BP <80 MM HG: CPT | Performed by: INTERNAL MEDICINE

## 2025-08-25 PROCEDURE — 3074F SYST BP LT 130 MM HG: CPT | Performed by: INTERNAL MEDICINE

## 2025-08-25 RX ORDER — CLOPIDOGREL BISULFATE 75 MG/1
TABLET ORAL
COMMUNITY
Start: 2025-06-16

## 2026-02-23 ENCOUNTER — APPOINTMENT (OUTPATIENT)
Dept: CARDIOLOGY | Facility: CLINIC | Age: 81
End: 2026-02-23
Payer: MEDICARE